# Patient Record
Sex: FEMALE | Race: OTHER | NOT HISPANIC OR LATINO | URBAN - METROPOLITAN AREA
[De-identification: names, ages, dates, MRNs, and addresses within clinical notes are randomized per-mention and may not be internally consistent; named-entity substitution may affect disease eponyms.]

---

## 2017-06-05 ENCOUNTER — INPATIENT (INPATIENT)
Facility: HOSPITAL | Age: 42
LOS: 2 days | Discharge: HOME | DRG: 417 | End: 2017-06-08
Attending: INTERNAL MEDICINE | Admitting: INTERNAL MEDICINE
Payer: COMMERCIAL

## 2017-06-05 VITALS
OXYGEN SATURATION: 100 % | TEMPERATURE: 98 F | RESPIRATION RATE: 16 BRPM | SYSTOLIC BLOOD PRESSURE: 136 MMHG | HEART RATE: 79 BPM | DIASTOLIC BLOOD PRESSURE: 87 MMHG

## 2017-06-05 DIAGNOSIS — K80.50 CALCULUS OF BILE DUCT WITHOUT CHOLANGITIS OR CHOLECYSTITIS WITHOUT OBSTRUCTION: ICD-10-CM

## 2017-06-05 LAB
ALBUMIN SERPL ELPH-MCNC: 4 G/DL — SIGNIFICANT CHANGE UP (ref 3.3–5)
ALP SERPL-CCNC: 270 U/L — HIGH (ref 40–120)
ALT FLD-CCNC: 599 U/L RC — HIGH (ref 10–45)
ANION GAP SERPL CALC-SCNC: 12 MMOL/L — SIGNIFICANT CHANGE UP (ref 5–17)
APPEARANCE UR: ABNORMAL
AST SERPL-CCNC: 366 U/L — HIGH (ref 10–40)
BACTERIA # UR AUTO: ABNORMAL /HPF
BASOPHILS # BLD AUTO: 0 K/UL — SIGNIFICANT CHANGE UP (ref 0–0.2)
BASOPHILS NFR BLD AUTO: 0.2 % — SIGNIFICANT CHANGE UP (ref 0–2)
BILIRUB SERPL-MCNC: 7.1 MG/DL — HIGH (ref 0.2–1.2)
BILIRUB UR-MCNC: ABNORMAL
BUN SERPL-MCNC: 7 MG/DL — SIGNIFICANT CHANGE UP (ref 7–23)
CALCIUM SERPL-MCNC: 9.5 MG/DL — SIGNIFICANT CHANGE UP (ref 8.4–10.5)
CHLORIDE SERPL-SCNC: 100 MMOL/L — SIGNIFICANT CHANGE UP (ref 96–108)
CO2 SERPL-SCNC: 29 MMOL/L — SIGNIFICANT CHANGE UP (ref 22–31)
COLOR SPEC: YELLOW — SIGNIFICANT CHANGE UP
CREAT SERPL-MCNC: 0.78 MG/DL — SIGNIFICANT CHANGE UP (ref 0.5–1.3)
DIFF PNL FLD: NEGATIVE — SIGNIFICANT CHANGE UP
EOSINOPHIL # BLD AUTO: 0.1 K/UL — SIGNIFICANT CHANGE UP (ref 0–0.5)
EOSINOPHIL NFR BLD AUTO: 2.6 % — SIGNIFICANT CHANGE UP (ref 0–6)
EPI CELLS # UR: SIGNIFICANT CHANGE UP /HPF
GLUCOSE SERPL-MCNC: 98 MG/DL — SIGNIFICANT CHANGE UP (ref 70–99)
GLUCOSE UR QL: NEGATIVE — SIGNIFICANT CHANGE UP
HCG UR QL: NEGATIVE — SIGNIFICANT CHANGE UP
HCT VFR BLD CALC: 40.3 % — SIGNIFICANT CHANGE UP (ref 34.5–45)
HGB BLD-MCNC: 13.1 G/DL — SIGNIFICANT CHANGE UP (ref 11.5–15.5)
KETONES UR-MCNC: NEGATIVE — SIGNIFICANT CHANGE UP
LEUKOCYTE ESTERASE UR-ACNC: NEGATIVE — SIGNIFICANT CHANGE UP
LYMPHOCYTES # BLD AUTO: 1.2 K/UL — SIGNIFICANT CHANGE UP (ref 1–3.3)
LYMPHOCYTES # BLD AUTO: 35.7 % — SIGNIFICANT CHANGE UP (ref 13–44)
MCHC RBC-ENTMCNC: 27.7 PG — SIGNIFICANT CHANGE UP (ref 27–34)
MCHC RBC-ENTMCNC: 32.5 GM/DL — SIGNIFICANT CHANGE UP (ref 32–36)
MCV RBC AUTO: 85.1 FL — SIGNIFICANT CHANGE UP (ref 80–100)
MONOCYTES # BLD AUTO: 0.2 K/UL — SIGNIFICANT CHANGE UP (ref 0–0.9)
MONOCYTES NFR BLD AUTO: 6.8 % — SIGNIFICANT CHANGE UP (ref 2–14)
NEUTROPHILS # BLD AUTO: 1.9 K/UL — SIGNIFICANT CHANGE UP (ref 1.8–7.4)
NEUTROPHILS NFR BLD AUTO: 54.7 % — SIGNIFICANT CHANGE UP (ref 43–77)
NITRITE UR-MCNC: NEGATIVE — SIGNIFICANT CHANGE UP
PH UR: 7 — SIGNIFICANT CHANGE UP (ref 5–8)
PLATELET # BLD AUTO: 201 K/UL — SIGNIFICANT CHANGE UP (ref 150–400)
POTASSIUM SERPL-MCNC: 3.9 MMOL/L — SIGNIFICANT CHANGE UP (ref 3.5–5.3)
POTASSIUM SERPL-SCNC: 3.9 MMOL/L — SIGNIFICANT CHANGE UP (ref 3.5–5.3)
PROT SERPL-MCNC: 7.9 G/DL — SIGNIFICANT CHANGE UP (ref 6–8.3)
PROT UR-MCNC: NEGATIVE — SIGNIFICANT CHANGE UP
RBC # BLD: 4.73 M/UL — SIGNIFICANT CHANGE UP (ref 3.8–5.2)
RBC # FLD: 13.3 % — SIGNIFICANT CHANGE UP (ref 10.3–14.5)
RBC CASTS # UR COMP ASSIST: SIGNIFICANT CHANGE UP /HPF (ref 0–2)
SODIUM SERPL-SCNC: 141 MMOL/L — SIGNIFICANT CHANGE UP (ref 135–145)
SP GR SPEC: 1.01 — LOW (ref 1.01–1.02)
UROBILINOGEN FLD QL: NEGATIVE — SIGNIFICANT CHANGE UP
WBC # BLD: 3.5 K/UL — LOW (ref 3.8–10.5)
WBC # FLD AUTO: 3.5 K/UL — LOW (ref 3.8–10.5)
WBC UR QL: SIGNIFICANT CHANGE UP /HPF (ref 0–5)

## 2017-06-05 PROCEDURE — 99253 IP/OBS CNSLTJ NEW/EST LOW 45: CPT | Mod: GC

## 2017-06-05 PROCEDURE — 99285 EMERGENCY DEPT VISIT HI MDM: CPT

## 2017-06-05 PROCEDURE — 76705 ECHO EXAM OF ABDOMEN: CPT | Mod: 26,RT

## 2017-06-05 RX ORDER — SODIUM CHLORIDE 9 MG/ML
2000 INJECTION INTRAMUSCULAR; INTRAVENOUS; SUBCUTANEOUS ONCE
Qty: 0 | Refills: 0 | Status: COMPLETED | OUTPATIENT
Start: 2017-06-05 | End: 2017-06-05

## 2017-06-05 RX ORDER — HEPARIN SODIUM 5000 [USP'U]/ML
5000 INJECTION INTRAVENOUS; SUBCUTANEOUS EVERY 12 HOURS
Qty: 0 | Refills: 0 | Status: DISCONTINUED | OUTPATIENT
Start: 2017-06-05 | End: 2017-06-07

## 2017-06-05 RX ORDER — PANTOPRAZOLE SODIUM 20 MG/1
40 TABLET, DELAYED RELEASE ORAL DAILY
Qty: 0 | Refills: 0 | Status: DISCONTINUED | OUTPATIENT
Start: 2017-06-05 | End: 2017-06-08

## 2017-06-05 RX ORDER — ACETAMINOPHEN 500 MG
650 TABLET ORAL EVERY 6 HOURS
Qty: 0 | Refills: 0 | Status: DISCONTINUED | OUTPATIENT
Start: 2017-06-05 | End: 2017-06-07

## 2017-06-05 RX ORDER — ONDANSETRON 8 MG/1
4 TABLET, FILM COATED ORAL ONCE
Qty: 0 | Refills: 0 | Status: COMPLETED | OUTPATIENT
Start: 2017-06-05 | End: 2017-06-05

## 2017-06-05 RX ORDER — PIPERACILLIN AND TAZOBACTAM 4; .5 G/20ML; G/20ML
3.38 INJECTION, POWDER, LYOPHILIZED, FOR SOLUTION INTRAVENOUS ONCE
Qty: 0 | Refills: 0 | Status: COMPLETED | OUTPATIENT
Start: 2017-06-05 | End: 2017-06-05

## 2017-06-05 RX ORDER — SODIUM CHLORIDE 9 MG/ML
1000 INJECTION, SOLUTION INTRAVENOUS
Qty: 0 | Refills: 0 | Status: DISCONTINUED | OUTPATIENT
Start: 2017-06-05 | End: 2017-06-07

## 2017-06-05 RX ADMIN — PIPERACILLIN AND TAZOBACTAM 200 GRAM(S): 4; .5 INJECTION, POWDER, LYOPHILIZED, FOR SOLUTION INTRAVENOUS at 17:18

## 2017-06-05 RX ADMIN — ONDANSETRON 4 MILLIGRAM(S): 8 TABLET, FILM COATED ORAL at 12:04

## 2017-06-05 RX ADMIN — Medication 204 MILLIGRAM(S): at 16:08

## 2017-06-05 RX ADMIN — SODIUM CHLORIDE 3000 MILLILITER(S): 9 INJECTION INTRAMUSCULAR; INTRAVENOUS; SUBCUTANEOUS at 12:04

## 2017-06-05 NOTE — ED ADULT NURSE NOTE - OBJECTIVE STATEMENT
Pt c/o nausea x 1 week after eating shrimp and pork.  Initially had vomiting as well, which has since resolved.  oral mucosa slightly dry.  Sclera icteric  Pt noted urine has become yellower in color.

## 2017-06-05 NOTE — ED PROVIDER NOTE - OBJECTIVE STATEMENT
37 yo female s/p viral GI illness one week ago with nausea/vomiting, and diarrhea c/o persistent nausea and yellowing of her eyes. Pt denies fevers. Has not had any vomiting or diarrhea in 5 days but became concerned by the yellowing of her eyes and dark colored urine so she went to an UCC. UCC referred her to the Ed for bloodwork. Denies abdominal pain, recent travel and recent abx use. No [facility] contacts. 40 yo female s/p viral GI illness one week ago with nausea/vomiting, and diarrhea c/o persistent nausea and yellowing of her eyes. Pt denies fevers. Has not had any vomiting or diarrhea in 5 days but became concerned by the yellowing of her eyes and dark colored urine so she went to an UCC. UCC referred her to the Ed for bloodwork. Denies abdominal pain, recent travel and recent abx use. No [facility] contacts. 40 yo female s/p viral GI illness one week ago with nausea/vomiting, and diarrhea c/o persistent nausea and yellowing of her eyes. Pt denies fevers. Has not had any vomiting or diarrhea in 5 days but became concerned by the yellowing of her eyes and dark colored urine so she went to an UCC. UCC referred her to the Ed for bloodwork. Denies abdominal pain, recent travel and recent abx use. No sick contacts.

## 2017-06-05 NOTE — H&P ADULT. - ASSESSMENT
pt w/ choledocholelithiasis/ jaundice/ elevated lfts  likely stone obstruction  ercp in am    npo after midnight  iv fluida  dvt proph  gi proph  analgesics prn  f/u lfts

## 2017-06-05 NOTE — H&P ADULT. - HISTORY OF PRESENT ILLNESS
42 yo female s/p viral GI illness one week ago with nausea/vomiting, and diarrhea c/o persistent nausea and yellowing of her eyes. Pt denies fevers. Has not had any vomiting or diarrhea in 5 days but became concerned by the yellowing of her eyes and dark colored urine so she went to urgent care  Denies abdominal pain, recent travel and recent abx use. No sick contacts.

## 2017-06-05 NOTE — ED ADULT NURSE REASSESSMENT NOTE - NS ED NURSE REASSESS COMMENT FT1
Report received from Monisha CHAVEZ.    Patient resting, family at bedside. Patient just admitted to hospital. Will continue to monitor.

## 2017-06-05 NOTE — ED PROVIDER NOTE - ATTENDING CONTRIBUTION TO CARE
MD Nicole:  patient seen and evaluated with the resident.  I was present for key portions of the History & Physical, and I agree with the Impression & Plan.  MD Nicole:  42 yo F, c/o intermittent epigastric & RUQ pain with associated nausea x 1 wk.  Nausea has been constant; pain has been intermittent.  Came to ED because she noticed scleral icterus today.  Nonsmoker.  VS - wnl.  Physical Exam: adult F, obese, NCAT, +scleral icterus, RRR, CTA B.  Abd:  +epigastric/RUQ pain, no R/G. Labs: Tbili = 7.1; elevated ast/alt and alk phos.   Impression:  obstructive jaundice most likely due to gallstones. No evidence of sepsis. Plan:  RUQ US, zofran, IVF, reassess.

## 2017-06-06 LAB
ALBUMIN SERPL ELPH-MCNC: 3.6 G/DL — SIGNIFICANT CHANGE UP (ref 3.3–5)
ALP SERPL-CCNC: 267 U/L — HIGH (ref 40–120)
ALT FLD-CCNC: 561 U/L — HIGH (ref 10–45)
ANION GAP SERPL CALC-SCNC: 17 MMOL/L — SIGNIFICANT CHANGE UP (ref 5–17)
AST SERPL-CCNC: 349 U/L — HIGH (ref 10–40)
BILIRUB SERPL-MCNC: 7.5 MG/DL — HIGH (ref 0.2–1.2)
BLD GP AB SCN SERPL QL: NEGATIVE — SIGNIFICANT CHANGE UP
BUN SERPL-MCNC: 6 MG/DL — LOW (ref 7–23)
CALCIUM SERPL-MCNC: 9.5 MG/DL — SIGNIFICANT CHANGE UP (ref 8.4–10.5)
CHLORIDE SERPL-SCNC: 104 MMOL/L — SIGNIFICANT CHANGE UP (ref 96–108)
CO2 SERPL-SCNC: 19 MMOL/L — LOW (ref 22–31)
CREAT SERPL-MCNC: 0.85 MG/DL — SIGNIFICANT CHANGE UP (ref 0.5–1.3)
GLUCOSE SERPL-MCNC: 83 MG/DL — SIGNIFICANT CHANGE UP (ref 70–99)
POTASSIUM SERPL-MCNC: 3.7 MMOL/L — SIGNIFICANT CHANGE UP (ref 3.5–5.3)
POTASSIUM SERPL-SCNC: 3.7 MMOL/L — SIGNIFICANT CHANGE UP (ref 3.5–5.3)
PROT SERPL-MCNC: 7.1 G/DL — SIGNIFICANT CHANGE UP (ref 6–8.3)
RH IG SCN BLD-IMP: POSITIVE — SIGNIFICANT CHANGE UP
SODIUM SERPL-SCNC: 140 MMOL/L — SIGNIFICANT CHANGE UP (ref 135–145)

## 2017-06-06 PROCEDURE — 43274 ERCP DUCT STENT PLACEMENT: CPT | Mod: GC

## 2017-06-06 PROCEDURE — 43264 ERCP REMOVE DUCT CALCULI: CPT | Mod: GC

## 2017-06-06 PROCEDURE — 43273 ENDOSCOPIC PANCREATOSCOPY: CPT | Mod: GC

## 2017-06-06 RX ORDER — DIPHENHYDRAMINE HCL 50 MG
25 CAPSULE ORAL ONCE
Qty: 0 | Refills: 0 | Status: COMPLETED | OUTPATIENT
Start: 2017-06-06 | End: 2017-06-06

## 2017-06-06 RX ORDER — CIPROFLOXACIN LACTATE 400MG/40ML
400 VIAL (ML) INTRAVENOUS EVERY 12 HOURS
Qty: 0 | Refills: 0 | Status: DISCONTINUED | OUTPATIENT
Start: 2017-06-06 | End: 2017-06-06

## 2017-06-06 RX ADMIN — Medication 125 MILLIGRAM(S): at 23:34

## 2017-06-06 RX ADMIN — SODIUM CHLORIDE 125 MILLILITER(S): 9 INJECTION, SOLUTION INTRAVENOUS at 00:30

## 2017-06-06 RX ADMIN — Medication 25 MILLIGRAM(S): at 21:15

## 2017-06-06 RX ADMIN — Medication 200 MILLIGRAM(S): at 17:34

## 2017-06-07 ENCOUNTER — RESULT REVIEW (OUTPATIENT)
Age: 42
End: 2017-06-07

## 2017-06-07 DIAGNOSIS — K80.50 CALCULUS OF BILE DUCT WITHOUT CHOLANGITIS OR CHOLECYSTITIS WITHOUT OBSTRUCTION: ICD-10-CM

## 2017-06-07 LAB
ALBUMIN SERPL ELPH-MCNC: 3.6 G/DL — SIGNIFICANT CHANGE UP (ref 3.3–5)
ALP SERPL-CCNC: 292 U/L — HIGH (ref 40–120)
ALT FLD-CCNC: 532 U/L — HIGH (ref 10–45)
AMYLASE P1 CFR SERPL: 46 U/L — SIGNIFICANT CHANGE UP (ref 25–125)
ANION GAP SERPL CALC-SCNC: 13 MMOL/L — SIGNIFICANT CHANGE UP (ref 5–17)
APTT BLD: 37.9 SEC — HIGH (ref 27.5–37.4)
AST SERPL-CCNC: 282 U/L — HIGH (ref 10–40)
BILIRUB SERPL-MCNC: 3.4 MG/DL — HIGH (ref 0.2–1.2)
BLD GP AB SCN SERPL QL: NEGATIVE — SIGNIFICANT CHANGE UP
BUN SERPL-MCNC: 8 MG/DL — SIGNIFICANT CHANGE UP (ref 7–23)
CALCIUM SERPL-MCNC: 9.1 MG/DL — SIGNIFICANT CHANGE UP (ref 8.4–10.5)
CHLORIDE SERPL-SCNC: 103 MMOL/L — SIGNIFICANT CHANGE UP (ref 96–108)
CO2 SERPL-SCNC: 22 MMOL/L — SIGNIFICANT CHANGE UP (ref 22–31)
CREAT SERPL-MCNC: 0.75 MG/DL — SIGNIFICANT CHANGE UP (ref 0.5–1.3)
GLUCOSE SERPL-MCNC: 165 MG/DL — HIGH (ref 70–99)
HCT VFR BLD CALC: 39.2 % — SIGNIFICANT CHANGE UP (ref 34.5–45)
HGB BLD-MCNC: 12.6 G/DL — SIGNIFICANT CHANGE UP (ref 11.5–15.5)
INR BLD: 1.14 RATIO — SIGNIFICANT CHANGE UP (ref 0.88–1.16)
LIDOCAIN IGE QN: 24 U/L — SIGNIFICANT CHANGE UP (ref 7–60)
MCHC RBC-ENTMCNC: 26.9 PG — LOW (ref 27–34)
MCHC RBC-ENTMCNC: 32.1 GM/DL — SIGNIFICANT CHANGE UP (ref 32–36)
MCV RBC AUTO: 83.6 FL — SIGNIFICANT CHANGE UP (ref 80–100)
PLATELET # BLD AUTO: 254 K/UL — SIGNIFICANT CHANGE UP (ref 150–400)
POTASSIUM SERPL-MCNC: 4.3 MMOL/L — SIGNIFICANT CHANGE UP (ref 3.5–5.3)
POTASSIUM SERPL-SCNC: 4.3 MMOL/L — SIGNIFICANT CHANGE UP (ref 3.5–5.3)
PROT SERPL-MCNC: 7.9 G/DL — SIGNIFICANT CHANGE UP (ref 6–8.3)
PROTHROM AB SERPL-ACNC: 12.9 SEC — SIGNIFICANT CHANGE UP (ref 10–13.1)
RBC # BLD: 4.69 M/UL — SIGNIFICANT CHANGE UP (ref 3.8–5.2)
RBC # FLD: 15.1 % — HIGH (ref 10.3–14.5)
RH IG SCN BLD-IMP: POSITIVE — SIGNIFICANT CHANGE UP
SODIUM SERPL-SCNC: 138 MMOL/L — SIGNIFICANT CHANGE UP (ref 135–145)
WBC # BLD: 4.06 K/UL — SIGNIFICANT CHANGE UP (ref 3.8–10.5)
WBC # FLD AUTO: 4.06 K/UL — SIGNIFICANT CHANGE UP (ref 3.8–10.5)

## 2017-06-07 PROCEDURE — 99232 SBSQ HOSP IP/OBS MODERATE 35: CPT | Mod: GC

## 2017-06-07 PROCEDURE — 88304 TISSUE EXAM BY PATHOLOGIST: CPT | Mod: 26

## 2017-06-07 RX ORDER — SODIUM CHLORIDE 9 MG/ML
1000 INJECTION, SOLUTION INTRAVENOUS
Qty: 0 | Refills: 0 | Status: DISCONTINUED | OUTPATIENT
Start: 2017-06-07 | End: 2017-06-08

## 2017-06-07 RX ORDER — HYDROMORPHONE HYDROCHLORIDE 2 MG/ML
0.5 INJECTION INTRAMUSCULAR; INTRAVENOUS; SUBCUTANEOUS
Qty: 0 | Refills: 0 | Status: DISCONTINUED | OUTPATIENT
Start: 2017-06-07 | End: 2017-06-07

## 2017-06-07 RX ORDER — DIPHENHYDRAMINE HCL 50 MG
25 CAPSULE ORAL ONCE
Qty: 0 | Refills: 0 | Status: COMPLETED | OUTPATIENT
Start: 2017-06-07 | End: 2017-06-07

## 2017-06-07 RX ORDER — PIPERACILLIN AND TAZOBACTAM 4; .5 G/20ML; G/20ML
3.38 INJECTION, POWDER, LYOPHILIZED, FOR SOLUTION INTRAVENOUS EVERY 8 HOURS
Qty: 0 | Refills: 0 | Status: COMPLETED | OUTPATIENT
Start: 2017-06-07 | End: 2017-06-08

## 2017-06-07 RX ORDER — ACETAMINOPHEN 500 MG
1000 TABLET ORAL ONCE
Qty: 0 | Refills: 0 | Status: DISCONTINUED | OUTPATIENT
Start: 2017-06-07 | End: 2017-06-07

## 2017-06-07 RX ORDER — PIPERACILLIN AND TAZOBACTAM 4; .5 G/20ML; G/20ML
3.38 INJECTION, POWDER, LYOPHILIZED, FOR SOLUTION INTRAVENOUS ONCE
Qty: 0 | Refills: 0 | Status: COMPLETED | OUTPATIENT
Start: 2017-06-07 | End: 2017-06-07

## 2017-06-07 RX ORDER — ONDANSETRON 8 MG/1
4 TABLET, FILM COATED ORAL ONCE
Qty: 0 | Refills: 0 | Status: DISCONTINUED | OUTPATIENT
Start: 2017-06-07 | End: 2017-06-07

## 2017-06-07 RX ORDER — HEPARIN SODIUM 5000 [USP'U]/ML
5000 INJECTION INTRAVENOUS; SUBCUTANEOUS EVERY 8 HOURS
Qty: 0 | Refills: 0 | Status: DISCONTINUED | OUTPATIENT
Start: 2017-06-07 | End: 2017-06-08

## 2017-06-07 RX ADMIN — Medication 25 MILLIGRAM(S): at 05:26

## 2017-06-07 RX ADMIN — HEPARIN SODIUM 5000 UNIT(S): 5000 INJECTION INTRAVENOUS; SUBCUTANEOUS at 21:12

## 2017-06-07 RX ADMIN — SODIUM CHLORIDE 100 MILLILITER(S): 9 INJECTION, SOLUTION INTRAVENOUS at 16:26

## 2017-06-07 RX ADMIN — PIPERACILLIN AND TAZOBACTAM 25 GRAM(S): 4; .5 INJECTION, POWDER, LYOPHILIZED, FOR SOLUTION INTRAVENOUS at 21:12

## 2017-06-07 RX ADMIN — PIPERACILLIN AND TAZOBACTAM 200 GRAM(S): 4; .5 INJECTION, POWDER, LYOPHILIZED, FOR SOLUTION INTRAVENOUS at 16:40

## 2017-06-07 NOTE — PROGRESS NOTE ADULT - SUBJECTIVE AND OBJECTIVE BOX
INTERVAL HPI/OVERNIGHT EVENTS: S/P ERCP yesterday, feeling a bit better. Afebrile. c/o RUQ discomfort. LFTs improving    STATUS POST:  ERCP    POST OPERATIVE DAY #: 1    MEDICATIONS  (STANDING):  dextrose 5% + sodium chloride 0.9%. 1000milliLiter(s) IV Continuous <Continuous>  heparin  Injectable 5000Unit(s) SubCutaneous every 12 hours  pantoprazole  Injectable 40milliGRAM(s) IV Push daily  acetaminophen  IVPB. 1000milliGRAM(s) IV Intermittent once    MEDICATIONS  (PRN):  acetaminophen  Suppository. 650milliGRAM(s) Rectal every 6 hours PRN Moderate Pain (4 - 6)      Vital Signs Last 24 Hrs  T(C): 36.6, Max: 37 (- @ 16:05)  T(F): 97.8, Max: 98.6 (- @ 16:05)  HR: 80 (68 - 86)  BP: 108/64 (102/68 - 122/84)  BP(mean): --  RR: 18 (18 - 18)  SpO2: 95% (95% - 100%)    PHYSICAL EXAM:      Constitutional: AOx3, NAD    Gastrointestinal: Abd soft, mild tenderness RUQ, ND.     Skin: no jaundice    Eyes: + scleral icterus        I&O's Detail  I & Os for 24h ending 2017 07:00  =============================================  IN:    dextrose 5% + sodium chloride 0.9%.: 2400 ml    Oral Fluid: 460 ml    IV PiggyBack: 150 ml    Total IN: 3010 ml  ---------------------------------------------  OUT:    Voided: 2700 ml    Total OUT: 2700 ml  ---------------------------------------------  Total NET: 310 ml    I & Os for current day (as of 2017 11:43)  =============================================  IN:    Total IN: 0 ml  ---------------------------------------------  OUT:    Voided: 800 ml    Total OUT: 800 ml  ---------------------------------------------  Total NET: -800 ml      LABS:                        12.6   4.06  )-----------( 254      ( 2017 09:12 )             39.2         138  |  103  |  8   ----------------------------<  165<H>  4.3   |  22  |  0.75    Ca    9.1      2017 09:09    TPro  7.9  /  Alb  3.6  /  TBili  3.4<H>  /  DBili  2.2<H>  /  AST  282<H>  /  ALT  532<H>  /  AlkPhos  292<H>      PT/INR - ( 2017 09:12 )   PT: 12.9 sec;   INR: 1.14 ratio         PTT - ( 2017 09:12 )  PTT:37.9 sec  Urinalysis Basic - ( 2017 12:01 )    Color: Yellow / Appearance: SL Turbid / S.006 / pH: x  Gluc: x / Ketone: Negative  / Bili: Small / Urobili: Negative   Blood: x / Protein: Negative / Nitrite: Negative   Leuk Esterase: Negative / RBC: 0-2 /HPF / WBC 3-5 /HPF   Sq Epi: x / Non Sq Epi: Few /HPF / Bacteria: Few /HPF        RADIOLOGY & ADDITIONAL STUDIES:

## 2017-06-07 NOTE — PROVIDER CONTACT NOTE (OTHER) - ACTION/TREATMENT ORDERED:
PA notified and ordered IV benadryl 25 mg IVP and solumedrol 125mg IVP to be given. PA will come to assess pt at bedside. Will administer medication and continue to monitor.

## 2017-06-07 NOTE — PROGRESS NOTE ADULT - SUBJECTIVE AND OBJECTIVE BOX
CHIEF COMPLAINT:Patient is a 41y old  Female who presents with a chief complaint of   	  Interval history: S/p ERCP with stent, planned for cholecystectomy      Allergies:  ciprofloxacin (Flushing (Skin); Eye Irritation)      PAST MEDICAL & SURGICAL HISTORY:  No pertinent past medical history  No significant past surgical history      FAMILY HISTORY:  No pertinent family history in first degree relatives      REVIEW OF SYSTEMS:  CONSTITUTIONAL: No fever, weight loss, or fatigue  EYES: No eye pain, visual disturbances, or discharge  NECK: No pain or stiffness  RESPIRATORY: No cough or wheezing, no shortness of breath  CARDIOVASCULAR: No chest pain, palpitations, dizziness, or leg swelling  GASTROINTESTINAL: No abdominal or epigastric pain. No nausea, vomiting, diarrhea or constipation  GENITOURINARY: No dysuria, urinary frequency or urgency, no hematuria  NEUROLOGICAL: No headaches, memory loss, loss of strength, numbness, or tremors  SKIN: No itching, burning, rashes, or lesions   MUSCULOSKELETAL: No joint pain or swelling; No muscle, back, or extremity pain    Medications:  MEDICATIONS  (STANDING):  dextrose 5% + sodium chloride 0.9%. 1000milliLiter(s) IV Continuous <Continuous>  heparin  Injectable 5000Unit(s) SubCutaneous every 12 hours  pantoprazole  Injectable 40milliGRAM(s) IV Push daily  acetaminophen  IVPB. 1000milliGRAM(s) IV Intermittent once    MEDICATIONS  (PRN):  acetaminophen  Suppository. 650milliGRAM(s) Rectal every 6 hours PRN Moderate Pain (4 - 6)    	    PHYSICAL EXAM:  T(C): 36.6, Max: 37 (06-06 @ 16:05)  HR: 80 (68 - 86)  BP: 108/64 (102/68 - 122/84)  RR: 18 (18 - 18)  SpO2: 98% (95% - 100%)  Wt(kg): --  I&O's Summary  I & Os for 24h ending 07 Jun 2017 07:00  =============================================  IN: 3010 ml / OUT: 2700 ml / NET: 310 ml    I & Os for current day (as of 07 Jun 2017 12:59)  =============================================  IN: 0 ml / OUT: 800 ml / NET: -800 ml      Appearance: Normal	  HEENT:   NCAT, PERRL, EOMI	  Lymphatic: No lymphadenopathy  Cardiovascular: Normal S1 S2, RRR  Respiratory: Lungs clear to auscultation BL  Psychiatry: A & O x 3, Mood & affect appropriate  Gastrointestinal:  Soft, Mild RUQ tenderness on palpation, no rebound, + BS  Skin: No rashes, No ecchymoses, No cyanosis	  Neurologic: Non-focal  Extremities: Normal range of motion, No clubbing, cyanosis or edema    	  LABS:	 	                            12.6   4.06  )-----------( 254      ( 07 Jun 2017 09:12 )             39.2     06-07    138  |  103  |  8   ----------------------------<  165<H>  4.3   |  22  |  0.75    Ca    9.1      07 Jun 2017 09:09    TPro  7.9  /  Alb  3.6  /  TBili  3.4<H>  /  DBili  2.2<H>  /  AST  282<H>  /  ALT  532<H>  /  AlkPhos  292<H>  06-07

## 2017-06-07 NOTE — PROGRESS NOTE ADULT - ASSESSMENT
Impression:  1) Obstructive Jaundice due to CBD Stone - s/p ERCP with sphincterotomy and multiple stone extractions s/p plastic CBD stent placement.  2) Cholelithiasis    Plan:  - Monitor liver enzymes  - Repeat ERCP in 6-8 weeks for CBD stent removal and additional stent removal  - Plan for Lap Cholecystectomy per Surgery

## 2017-06-07 NOTE — PROVIDER CONTACT NOTE (OTHER) - ASSESSMENT
Pt having allergic reaction. Pt objectively with red mask around eyes, and is coughing and clearing thoat. Pt subjectively states that her throat is itchy and her face feels warm. Denies difficulty breathing. Pt received Cipro 400 mg in 200ml at 1734, pt denies ever having cipro before.

## 2017-06-07 NOTE — BRIEF OPERATIVE NOTE - OPERATION/FINDINGS
s/p laparoscopic cholecystectomy. The gallbladder was inflamed with an evident perforated wall, with gross spillage of bile and stones. After careful dissection, the cystic duct and artery were clipped and transected. The gallbladder was carefully dissected off of the liver bed with appropriate hemostasis. The liver bed and GB fossa were thoroughly irrigated at the end of the case. The gallstones were suctioned, and the field was clear and hemostatic by the end of the case.

## 2017-06-07 NOTE — PROGRESS NOTE ADULT - SUBJECTIVE AND OBJECTIVE BOX
ADVANCED GI FOLLOW UP NOTE:    SUBJECTIVE:  - Patient denies nausea, vomiting or abdominal pain  - Had allergic reaction to Ciprofloxacin (facial and eye swelling) and given Benadryl/Solumedrol  - No fever or chills    OBJECTIVE:    Vital Signs Last 24 Hrs  T(C): 36.7, Max: 37 (06-06 @ 16:05)  T(F): 98, Max: 98.6 (06-06 @ 16:05)  HR: 79 (62 - 86)  BP: 116/75 (102/68 - 122/84)  BP(mean): --  RR: 18 (18 - 18)  SpO2: 97% (95% - 100%)    PHYSICAL EXAM:  Constitutional: no acute distress  Eyes: no icterus  Neck: no masses, no LAD  Respiratory: normal inspiratory effort; no wheezing or crackles  Cardiovascular: RRR, normal S1/S2, no murmurs/rubs/gallops  Gastrointestinal: soft, nondistended, nontender, +BS  Rectal:   Extremities: no LE edema  Neurological: AAOx3, no asterixis  Skin: no rashes, bruises, petechiae    LABS:                      13.1   3.5   )-----------( 201      ( 05 Jun 2017 12:01 )             40.3     06-06    140  |  104  |  6<L>  ----------------------------<  83  3.7   |  19<L>  |  0.85    Ca    9.5      06 Jun 2017 08:27  TPro  7.1  /  Alb  3.6  /  TBili  7.5<H>  /  DBili  x   /  AST  349<H>  /  ALT  561<H>  /  AlkPhos  267<H>  06-06    LIVER FUNCTIONS - ( 06 Jun 2017 08:27 )  Alb: 3.6 g/dL / Pro: 7.1 g/dL / ALK PHOS: 267 U/L / ALT: 561 U/L / AST: 349 U/L

## 2017-06-07 NOTE — PROGRESS NOTE ADULT - ATTENDING COMMENTS
Impression:  #1.  CBD stones, s/p ERCP/sphincterotomy/stone clearance/biliary stent yesterday 6/6/17  #2.  Abnormal LFTs    Plan:  #1.  Follow LFTs  #2.  Cholecystectomy per surgery.

## 2017-06-07 NOTE — BRIEF OPERATIVE NOTE - POST-OP DX
Cholecystitis, acute with cholelithiasis  06/07/2017  Acute on chronic + choledocholithiasis  Active  Horace Vargas

## 2017-06-07 NOTE — PROGRESS NOTE ADULT - ASSESSMENT
42 yo F with obstructive jaundice, biliary colic, cholecystolithiasis:  1. Obstructive jaundice, biliary colic - s/p ERCP with stent, no need for antibiotics, pain resolved, will need repeat ERCP as outpt for stent removal, c/w PPI  2. Cholecystolithiasis - pt planned for cholecystectomy today, pt has no known cardiac history, good functional capacity >4mets, no chest pain or SOB on exertion, she is low risk for moderate risk procedure, medically optimized for OR.  3. DVT prophylaxis

## 2017-06-08 VITALS
TEMPERATURE: 99 F | OXYGEN SATURATION: 95 % | SYSTOLIC BLOOD PRESSURE: 115 MMHG | RESPIRATION RATE: 18 BRPM | DIASTOLIC BLOOD PRESSURE: 77 MMHG | HEART RATE: 95 BPM

## 2017-06-08 LAB
ALBUMIN SERPL ELPH-MCNC: 3.5 G/DL — SIGNIFICANT CHANGE UP (ref 3.3–5)
ALP SERPL-CCNC: 220 U/L — HIGH (ref 40–120)
ALT FLD-CCNC: 428 U/L RC — HIGH (ref 10–45)
ANION GAP SERPL CALC-SCNC: 12 MMOL/L — SIGNIFICANT CHANGE UP (ref 5–17)
AST SERPL-CCNC: 210 U/L — HIGH (ref 10–40)
BILIRUB DIRECT SERPL-MCNC: 1.2 MG/DL — HIGH (ref 0–0.2)
BILIRUB INDIRECT FLD-MCNC: 1.1 MG/DL — HIGH (ref 0.2–1)
BILIRUB SERPL-MCNC: 2.3 MG/DL — HIGH (ref 0.2–1.2)
BUN SERPL-MCNC: 7 MG/DL — SIGNIFICANT CHANGE UP (ref 7–23)
CALCIUM SERPL-MCNC: 8.6 MG/DL — SIGNIFICANT CHANGE UP (ref 8.4–10.5)
CHLORIDE SERPL-SCNC: 103 MMOL/L — SIGNIFICANT CHANGE UP (ref 96–108)
CO2 SERPL-SCNC: 26 MMOL/L — SIGNIFICANT CHANGE UP (ref 22–31)
CREAT SERPL-MCNC: 0.84 MG/DL — SIGNIFICANT CHANGE UP (ref 0.5–1.3)
GLUCOSE SERPL-MCNC: 103 MG/DL — HIGH (ref 70–99)
HCT VFR BLD CALC: 39.1 % — SIGNIFICANT CHANGE UP (ref 34.5–45)
HGB BLD-MCNC: 12.5 G/DL — SIGNIFICANT CHANGE UP (ref 11.5–15.5)
MAGNESIUM SERPL-MCNC: 1.9 MG/DL — SIGNIFICANT CHANGE UP (ref 1.6–2.6)
MCHC RBC-ENTMCNC: 27.4 PG — SIGNIFICANT CHANGE UP (ref 27–34)
MCHC RBC-ENTMCNC: 32 GM/DL — SIGNIFICANT CHANGE UP (ref 32–36)
MCV RBC AUTO: 85.9 FL — SIGNIFICANT CHANGE UP (ref 80–100)
PHOSPHATE SERPL-MCNC: 3 MG/DL — SIGNIFICANT CHANGE UP (ref 2.5–4.5)
PLATELET # BLD AUTO: 191 K/UL — SIGNIFICANT CHANGE UP (ref 150–400)
POTASSIUM SERPL-MCNC: 3.6 MMOL/L — SIGNIFICANT CHANGE UP (ref 3.5–5.3)
POTASSIUM SERPL-SCNC: 3.6 MMOL/L — SIGNIFICANT CHANGE UP (ref 3.5–5.3)
PROT SERPL-MCNC: 6.8 G/DL — SIGNIFICANT CHANGE UP (ref 6–8.3)
RBC # BLD: 4.56 M/UL — SIGNIFICANT CHANGE UP (ref 3.8–5.2)
RBC # FLD: 13.7 % — SIGNIFICANT CHANGE UP (ref 10.3–14.5)
SODIUM SERPL-SCNC: 141 MMOL/L — SIGNIFICANT CHANGE UP (ref 135–145)
WBC # BLD: 5.9 K/UL — SIGNIFICANT CHANGE UP (ref 3.8–10.5)
WBC # FLD AUTO: 5.9 K/UL — SIGNIFICANT CHANGE UP (ref 3.8–10.5)

## 2017-06-08 PROCEDURE — 81001 URINALYSIS AUTO W/SCOPE: CPT

## 2017-06-08 PROCEDURE — 96374 THER/PROPH/DIAG INJ IV PUSH: CPT

## 2017-06-08 PROCEDURE — 74330 X-RAY BILE/PANC ENDOSCOPY: CPT

## 2017-06-08 PROCEDURE — 82150 ASSAY OF AMYLASE: CPT

## 2017-06-08 PROCEDURE — 86850 RBC ANTIBODY SCREEN: CPT

## 2017-06-08 PROCEDURE — 83735 ASSAY OF MAGNESIUM: CPT

## 2017-06-08 PROCEDURE — 99285 EMERGENCY DEPT VISIT HI MDM: CPT | Mod: 25

## 2017-06-08 PROCEDURE — 81025 URINE PREGNANCY TEST: CPT

## 2017-06-08 PROCEDURE — 85730 THROMBOPLASTIN TIME PARTIAL: CPT

## 2017-06-08 PROCEDURE — C1769: CPT

## 2017-06-08 PROCEDURE — 86900 BLOOD TYPING SEROLOGIC ABO: CPT

## 2017-06-08 PROCEDURE — 88304 TISSUE EXAM BY PATHOLOGIST: CPT

## 2017-06-08 PROCEDURE — 80076 HEPATIC FUNCTION PANEL: CPT

## 2017-06-08 PROCEDURE — 76705 ECHO EXAM OF ABDOMEN: CPT

## 2017-06-08 PROCEDURE — 82248 BILIRUBIN DIRECT: CPT

## 2017-06-08 PROCEDURE — 85027 COMPLETE CBC AUTOMATED: CPT

## 2017-06-08 PROCEDURE — 85610 PROTHROMBIN TIME: CPT

## 2017-06-08 PROCEDURE — 80048 BASIC METABOLIC PNL TOTAL CA: CPT

## 2017-06-08 PROCEDURE — 84100 ASSAY OF PHOSPHORUS: CPT

## 2017-06-08 PROCEDURE — C1889: CPT

## 2017-06-08 PROCEDURE — C2625: CPT

## 2017-06-08 PROCEDURE — 86901 BLOOD TYPING SEROLOGIC RH(D): CPT

## 2017-06-08 PROCEDURE — 83690 ASSAY OF LIPASE: CPT

## 2017-06-08 PROCEDURE — 80053 COMPREHEN METABOLIC PANEL: CPT

## 2017-06-08 RX ORDER — DIPHENHYDRAMINE HCL 50 MG
50 CAPSULE ORAL EVERY 6 HOURS
Qty: 0 | Refills: 0 | Status: DISCONTINUED | OUTPATIENT
Start: 2017-06-08 | End: 2017-06-08

## 2017-06-08 RX ORDER — OXYCODONE HYDROCHLORIDE 5 MG/1
1 TABLET ORAL
Qty: 30 | Refills: 0 | OUTPATIENT
Start: 2017-06-08 | End: 2017-06-13

## 2017-06-08 RX ADMIN — Medication 50 MILLIGRAM(S): at 03:34

## 2017-06-08 RX ADMIN — PIPERACILLIN AND TAZOBACTAM 25 GRAM(S): 4; .5 INJECTION, POWDER, LYOPHILIZED, FOR SOLUTION INTRAVENOUS at 05:43

## 2017-06-08 RX ADMIN — HEPARIN SODIUM 5000 UNIT(S): 5000 INJECTION INTRAVENOUS; SUBCUTANEOUS at 05:43

## 2017-06-08 NOTE — DISCHARGE NOTE ADULT - HOSPITAL COURSE
Underwent ERCP with GI. Pt underwent lap cholecystectomy. 40 yo female s/p viral GI illness one week ago with nausea/vomiting, and diarrhea c/o persistent nausea and yellowing of her eyes. Pt denies fevers. Has not had any vomiting or diarrhea in 5 days but became concerned by the yellowing of her eyes and dark colored urine so she went to urgent care.  Denies abdominal pain, recent travel and recent abx use, No sick contacts.  Abd ultrasound  done showed Cholelithiasis and choledocholithiasis. Elevated LFTs.     ERCP with GI underwent biliary sphincterotomy, extraction of thick sludge and multiple stones, cholangioscopy, and placement of plastic biliary stent. LFTs trending down Pt taken to OR underwent lap cholecystectomy. Post op LFTs continued downtrending. Pt tolerating diet, pain controleld, no n/v. pt discharged home to follow up with Dr. Dylan Harris 40 yo female s/p viral GI illness one week ago with nausea/vomiting, and diarrhea c/o persistent nausea and yellowing of her eyes. Pt denies fevers. Has not had any vomiting or diarrhea in 5 days but became concerned by the yellowing of her eyes and dark colored urine so she went to urgent care.  Denies abdominal pain, recent travel and recent abx use, No sick contacts.  Abd ultrasound  done showed Cholelithiasis and choledocholithiasis. Elevated LFTs.  ERCP with GI underwent biliary sphincterotomy, extraction of thick sludge and multiple stones, cholangioscopy, and placement of plastic biliary stent. LFTs trending down Pt taken to OR underwent lap cholecystectomy. Post op LFTs continued downtrending. Pt tolerating diet, pain controleld, no n/v. pt discharged home to follow up with Dr. Dylan Harris

## 2017-06-08 NOTE — DISCHARGE NOTE ADULT - PLAN OF CARE
post op care diet as tolerated, pain medication as needed, no heavy lifting or straining, may shower Please pat steri strips dry after showering, follow up with Dr. Limon in 1-2 weeks  Repeat ERCP in 6-8 weeks for CBD stent removal and additional stent removal  - Follow up with GI Clinic (Dr Bledsoe/Dr Harris [phone] in 1-2 weeks     notify Dr. Limon if develop fever, chills, nausea, vomiting

## 2017-06-08 NOTE — DISCHARGE NOTE ADULT - CARE PROVIDER_API CALL
Juanjo Limon), Surgery  [address] [facility] [address]  [place] [address]  Phone: [phone]  Fax: [phone]    Juan Harris), Gastroenterology  [address] [place] [address]  Phone: [phone]  Fax: [phone]

## 2017-06-08 NOTE — PROGRESS NOTE ADULT - SUBJECTIVE AND OBJECTIVE BOX
GENERAL SURGERY DAILY PROGRESS NOTE:     Hospital Day: 2    Postoperative Day: 1    Status post: Tanmay simon    Subjective:              Objective:    PE:    Vital Signs Last 24 Hrs  T(C): 36.7, Max: 37 (06-07 @ 20:45)  T(F): 98.1, Max: 98.6 (06-07 @ 20:45)  HR: 72 (72 - 87)  BP: 106/68 (106/68 - 133/76)  BP(mean): 94 (87 - 96)  RR: 18 (16 - 18)  SpO2: 100% (95% - 100%)    I&O's Detail  I & Os for 24h ending 07 Jun 2017 07:00  =============================================  IN:    dextrose 5% + sodium chloride 0.9%: 2400 ml    Oral Fluid: 460 ml    IV PiggyBack: 150 ml    Total IN: 3010 ml  ---------------------------------------------  OUT:    Voided: 2700 ml    Total OUT: 2700 ml  ---------------------------------------------  Total NET: 310 ml    I & Os for current day (as of 08 Jun 2017 03:59)  =============================================  IN:    lactated ringers.: 300 ml    Oral Fluid: 300 ml    Total IN: 600 ml  ---------------------------------------------  OUT:    Voided: 1800 ml    Total OUT: 1800 ml  ---------------------------------------------  Total NET: -1200 ml      Daily Height in cm: 160 (07 Jun 2017 11:51)    Daily     MEDICATIONS  (STANDING):  pantoprazole  Injectable 40milliGRAM(s) IV Push daily  heparin  Injectable 5000Unit(s) SubCutaneous every 8 hours  piperacillin/tazobactam IVPB. 3.375Gram(s) IV Intermittent every 8 hours    MEDICATIONS  (PRN):  oxyCODONE  5 mG/acetaminophen 325 mG 1Tablet(s) Oral every 4 hours PRN Moderate Pain (4 - 6)  oxyCODONE  5 mG/acetaminophen 325 mG 2Tablet(s) Oral every 4 hours PRN Severe Pain (7 - 10)  diphenhydrAMINE   Capsule 50milliGRAM(s) Oral every 6 hours PRN Rash and/or Itching      LABS:                        12.6   4.06  )-----------( 254      ( 07 Jun 2017 09:12 )             39.2     06-07    138  |  103  |  8   ----------------------------<  165<H>  4.3   |  22  |  0.75    Ca    9.1      07 Jun 2017 09:09    TPro  7.9  /  Alb  3.6  /  TBili  3.4<H>  /  DBili  2.2<H>  /  AST  282<H>  /  ALT  532<H>  /  AlkPhos  292<H>  06-07    PT/INR - ( 07 Jun 2017 09:12 )   PT: 12.9 sec;   INR: 1.14 ratio         PTT - ( 07 Jun 2017 09:12 )  PTT:37.9 sec      RADIOLOGY & ADDITIONAL STUDIES: GENERAL SURGERY DAILY PROGRESS NOTE:     Hospital Day: 2    Postoperative Day: 1    Status post: Tanmay montes    Subjective:  No overnight events. Pt states pain controlled, tolerating diet, ambulating.  Denies any nausea/ vomiting/ SOB/ CP/ Palpitations.             Objective:    PE:  Gen: NAD A&O x3  Abdomen: softly distended appropriately tender                   incisions C/D/I      Vital Signs Last 24 Hrs  T(C): 36.7, Max: 37 (06-07 @ 20:45)  T(F): 98.1, Max: 98.6 (06-07 @ 20:45)  HR: 72 (72 - 87)  BP: 106/68 (106/68 - 133/76)  BP(mean): 94 (87 - 96)  RR: 18 (16 - 18)  SpO2: 100% (95% - 100%)    I&O's Detail  I & Os for 24h ending 07 Jun 2017 07:00  =============================================  IN:    dextrose 5% + sodium chloride 0.9%: 2400 ml    Oral Fluid: 460 ml    IV PiggyBack: 150 ml    Total IN: 3010 ml  ---------------------------------------------  OUT:    Voided: 2700 ml    Total OUT: 2700 ml  ---------------------------------------------  Total NET: 310 ml    I & Os for current day (as of 08 Jun 2017 03:59)  =============================================  IN:    lactated ringers.: 300 ml    Oral Fluid: 300 ml    Total IN: 600 ml  ---------------------------------------------  OUT:    Voided: 1800 ml    Total OUT: 1800 ml  ---------------------------------------------  Total NET: -1200 ml      Daily Height in cm: 160 (07 Jun 2017 11:51)    Daily     MEDICATIONS  (STANDING):  pantoprazole  Injectable 40milliGRAM(s) IV Push daily  heparin  Injectable 5000Unit(s) SubCutaneous every 8 hours  piperacillin/tazobactam IVPB. 3.375Gram(s) IV Intermittent every 8 hours    MEDICATIONS  (PRN):  oxyCODONE  5 mG/acetaminophen 325 mG 1Tablet(s) Oral every 4 hours PRN Moderate Pain (4 - 6)  oxyCODONE  5 mG/acetaminophen 325 mG 2Tablet(s) Oral every 4 hours PRN Severe Pain (7 - 10)  diphenhydrAMINE   Capsule 50milliGRAM(s) Oral every 6 hours PRN Rash and/or Itching      LABS:                        12.6   4.06  )-----------( 254      ( 07 Jun 2017 09:12 )             39.2     06-07    138  |  103  |  8   ----------------------------<  165<H>  4.3   |  22  |  0.75    Ca    9.1      07 Jun 2017 09:09    TPro  7.9  /  Alb  3.6  /  TBili  3.4<H>  /  DBili  2.2<H>  /  AST  282<H>  /  ALT  532<H>  /  AlkPhos  292<H>  06-07    PT/INR - ( 07 Jun 2017 09:12 )   PT: 12.9 sec;   INR: 1.14 ratio         PTT - ( 07 Jun 2017 09:12 )  PTT:37.9 sec

## 2017-06-08 NOTE — PROGRESS NOTE ADULT - ASSESSMENT
41F choledocholithiasis s/p ERCP, stone extraction, CBD stent, and sphincteroplasty, s/p lap simon 41F choledocholithiasis s/p ERCP, stone extraction, CBD stent, and sphincteroplasty, s/p lap simon  - Reg diet  - OOB/AMB  - continue pain control regimen  - D/C home

## 2017-06-08 NOTE — DISCHARGE NOTE ADULT - CARE PLAN
Principal Discharge DX:	Choledocholithiasis  Goal:	post op care  Instructions for follow-up, activity and diet:	diet as tolerated, pain medication as needed, no heavy lifting or straining, may shower Please pat steri strips dry after showering, follow up with Dr. Limon in 1-2 weeks  Repeat ERCP in 6-8 weeks for CBD stent removal and additional stent removal  - Follow up with GI Clinic (Dr Bledsoe/Dr Harris [phone] in 1-2 weeks     notify Dr. Limon if develop fever, chills, nausea, vomiting Principal Discharge DX:	Choledocholithiasis  Goal:	post op care  Instructions for follow-up, activity and diet:	diet as tolerated, pain medication as needed, no heavy lifting or straining, may shower Please pat steri strips dry after showering, follow up with Dr. Limon in 1-2 weeks  Repeat ERCP in 6-8 weeks for CBD stent removal and additional stent removal  - Follow up with GI Clinic (Dr Bledsoe/Dr Harris [phone] in 1-2 weeks     notify Dr. Limon if develop fever, chills, nausea, vomiting Principal Discharge DX:	Choledocholithiasis  Goal:	post op care  Instructions for follow-up, activity and diet:	diet as tolerated, pain medication as needed, no heavy lifting or straining, may shower Please pat steri strips dry after showering, follow up with Dr. Limon in 1-2 weeks  Repeat ERCP in 6-8 weeks for CBD stent removal and additional stent removal  - Follow up with GI Clinic (Dr Bledsoe/Dr Harris [phone] in 1-2 weeks     notify Dr. Limon if develop fever, chills, nausea, vomiting Principal Discharge DX:	Choledocholithiasis  Goal:	post op care  Instructions for follow-up, activity and diet:	diet as tolerated, pain medication as needed, no heavy lifting or straining, may shower Please pat steri strips dry after showering, follow up with Dr. Limon in 1-2 weeks  Repeat ERCP in 6-8 weeks for CBD stent removal and additional stent removal  - Follow up with GI Clinic (Dr Bledsoe/Dr Harris [phone] in 1-2 weeks     notify Dr. Limon if develop fever, chills, nausea, vomiting

## 2017-06-08 NOTE — CHART NOTE - NSCHARTNOTEFT_GEN_A_CORE
S: Patient underwent Laparoscopic cholecystectomy. Intraoperatively, the gallbladder was found to be perforated with gross spillage of bile and stones in the abdomen. She tolerated procedure without issue and was sent to PACU. Patient denies chest pain, shortness of breath, nausea, vomiting, lightheadedness, or dizziness.  Pain was well controlled.    O:T(C): 37, Max: 37 (06-07 @ 20:45)  HR: 80 (74 - 82)  BP: 121/76 (121/76 - 131/67)  RR: 18 (16 - 18)  SpO2: 100% (98% - 100%)  Wt(kg): --                        12.6   4.06  )-----------( 254      ( 07 Jun 2017 09:12 )             39.2        06-07    138  |  103  |  8   ----------------------------<  165<H>  4.3   |  22  |  0.75    Ca    9.1      07 Jun 2017 09:09      Gen: NAD  Abd: Soft, appropriately TTP, nondistended.  No palpable masses. incisions c/d/i        Assessment/Plan:  41y Female s/p Laparoscopic cholecystectomy    c/w Zosyn overnight for bile spillage  Pain control  Reg Diet  DVT PPX  Out of bed and encourage early ambulation  Incentive spirometry  d/c home t/m

## 2017-06-08 NOTE — PROGRESS NOTE ADULT - ASSESSMENT
40 yo F with obstructive jaundice, biliary colic, cholecystolithiasis:  1. Obstructive jaundice, biliary colic - s/p ERCP with stent, follow with GI outpt  2. Cholecystolithiasis - s/p lap cholecystectomy, pain control, discharge home today  3. DVT prophylaxis

## 2017-06-08 NOTE — PROGRESS NOTE ADULT - SUBJECTIVE AND OBJECTIVE BOX
ADVANCED GI FOLLOW UP NOTE:    SUBJECTIVE:  - Pt tolerating oral diet  - Denies nausea or vomiting    OBJECTIVE:  Vital Signs Last 24 Hrs  T(C): 36.8, Max: 37 (06-07 @ 20:45)  T(F): 98.2, Max: 98.6 (06-07 @ 20:45)  HR: 79 (72 - 87)  BP: 118/79 (106/68 - 133/76)  BP(mean): 94 (87 - 96)  RR: 18 (16 - 18)  SpO2: 97% (95% - 100%)    PHYSICAL EXAM:  Constitutional: no acute distress  Eyes: no icterus  Neck: no masses, no LAD  Respiratory: normal inspiratory effort; no wheezing or crackles  Cardiovascular: RRR, normal S1/S2, no murmurs/rubs/gallops  Gastrointestinal: soft, surgical scars c/d/i, NT, +BS  Extremities: no LE edema  Neurological: AAOx3, no asterixis  Skin: no rashes, bruises, petechiae    LABS:                        12.6   4.06  )-----------( 254      ( 07 Jun 2017 09:12 )             39.2     06-07    138  |  103  |  8   ----------------------------<  165<H>  4.3   |  22  |  0.75    Ca    9.1      07 Jun 2017 09:09  TPro  7.9  /  Alb  3.6  /  TBili  3.4<H>  /  DBili  2.2<H>  /  AST  282<H>  /  ALT  532<H>  /  AlkPhos  292<H>  06-07  PT/INR - ( 07 Jun 2017 09:12 )   PT: 12.9 sec;   INR: 1.14 ratio    PTT - ( 07 Jun 2017 09:12 )  PTT:37.9 sec  LIVER FUNCTIONS - ( 07 Jun 2017 09:09 )  Alb: 3.6 g/dL / Pro: 7.9 g/dL / ALK PHOS: 292 U/L / ALT: 532 U/L / AST: 282 U/L / GGT: x

## 2017-06-08 NOTE — PROGRESS NOTE ADULT - SUBJECTIVE AND OBJECTIVE BOX
CHIEF COMPLAINT:Patient is a 41y old  Female who presents with a chief complaint of   	  Interval history: S/p ERCP with stent, planned for cholecystectomy      Allergies:  ciprofloxacin (Flushing (Skin); Eye Irritation)      PAST MEDICAL & SURGICAL HISTORY:  No pertinent past medical history  No significant past surgical history      FAMILY HISTORY:  No pertinent family history in first degree relatives      REVIEW OF SYSTEMS:  CONSTITUTIONAL: No fever, weight loss, or fatigue  EYES: No eye pain, visual disturbances, or discharge  NECK: No pain or stiffness  RESPIRATORY: No cough or wheezing, no shortness of breath  CARDIOVASCULAR: No chest pain, palpitations, dizziness, or leg swelling  GASTROINTESTINAL: Mild postsurgical pain. No nausea, vomiting, diarrhea or constipation  GENITOURINARY: No dysuria, urinary frequency or urgency, no hematuria  NEUROLOGICAL: No headaches, memory loss, loss of strength, numbness, or tremors  SKIN: No itching, burning, rashes, or lesions   MUSCULOSKELETAL: No joint pain or swelling; No muscle, back, or extremity pain      Medications:  MEDICATIONS  (STANDING):  pantoprazole  Injectable 40milliGRAM(s) IV Push daily  heparin  Injectable 5000Unit(s) SubCutaneous every 8 hours    MEDICATIONS  (PRN):  oxyCODONE  5 mG/acetaminophen 325 mG 1Tablet(s) Oral every 4 hours PRN Moderate Pain (4 - 6)  oxyCODONE  5 mG/acetaminophen 325 mG 2Tablet(s) Oral every 4 hours PRN Severe Pain (7 - 10)  diphenhydrAMINE   Capsule 50milliGRAM(s) Oral every 6 hours PRN Rash and/or Itching    	    PHYSICAL EXAM:  T(C): 37, Max: 37 (06-07 @ 20:45)  HR: 95 (72 - 95)  BP: 115/77 (106/68 - 133/76)  RR: 18 (16 - 18)  SpO2: 95% (95% - 100%)  Wt(kg): --  I&O's Summary  I & Os for 24h ending 08 Jun 2017 07:00  =============================================  IN: 600 ml / OUT: 1800 ml / NET: -1200 ml    I & Os for current day (as of 08 Jun 2017 12:45)  =============================================  IN: 0 ml / OUT: 600 ml / NET: -600 ml      Appearance: Normal	  HEENT:   NCAT, PERRL, EOMI	  Lymphatic: No lymphadenopathy  Cardiovascular: Normal S1 S2, RRR  Respiratory: Lungs clear to auscultation BL  Psychiatry: A & O x 3, Mood & affect appropriate  Gastrointestinal:  Soft, Mild RUQ tenderness on palpation, no rebound, + BS  Skin: No rashes, No ecchymoses, No cyanosis	  Neurologic: Non-focal  Extremities: Normal range of motion, No clubbing, cyanosis or edema    	  LABS:	 	                          12.5   5.9   )-----------( 191      ( 08 Jun 2017 08:22 )             39.1     06-08    141  |  103  |  7   ----------------------------<  103<H>  3.6   |  26  |  0.84    Ca    8.6      08 Jun 2017 08:22  Phos  3.0     06-08  Mg     1.9     06-08    TPro  6.8  /  Alb  3.5  /  TBili  2.3<H>  /  DBili  1.2<H>  /  AST  210<H>  /  ALT  428<H>  /  AlkPhos  220<H>  06-08    proBNP:   Lipid Profile:   HgA1c:   TSH: CHIEF COMPLAINT:Patient is a 41y old  Female who presents with a chief complaint of biliary colic  	  Interval history: S/p cholecystectomy, tolerated well, ambulating      Allergies:  ciprofloxacin (Flushing (Skin); Eye Irritation)      PAST MEDICAL & SURGICAL HISTORY:  No pertinent past medical history  No significant past surgical history      FAMILY HISTORY:  No pertinent family history in first degree relatives      REVIEW OF SYSTEMS:  CONSTITUTIONAL: No fever, weight loss, or fatigue  EYES: No eye pain, visual disturbances, or discharge  NECK: No pain or stiffness  RESPIRATORY: No cough or wheezing, no shortness of breath  CARDIOVASCULAR: No chest pain, palpitations, dizziness, or leg swelling  GASTROINTESTINAL: Mild postsurgical pain. No nausea, vomiting, diarrhea or constipation  GENITOURINARY: No dysuria, urinary frequency or urgency, no hematuria  NEUROLOGICAL: No headaches, memory loss, loss of strength, numbness, or tremors  SKIN: No itching, burning, rashes, or lesions   MUSCULOSKELETAL: No joint pain or swelling; No muscle, back, or extremity pain      Medications:  MEDICATIONS  (STANDING):  pantoprazole  Injectable 40milliGRAM(s) IV Push daily  heparin  Injectable 5000Unit(s) SubCutaneous every 8 hours    MEDICATIONS  (PRN):  oxyCODONE  5 mG/acetaminophen 325 mG 1Tablet(s) Oral every 4 hours PRN Moderate Pain (4 - 6)  oxyCODONE  5 mG/acetaminophen 325 mG 2Tablet(s) Oral every 4 hours PRN Severe Pain (7 - 10)  diphenhydrAMINE   Capsule 50milliGRAM(s) Oral every 6 hours PRN Rash and/or Itching    	    PHYSICAL EXAM:  T(C): 37, Max: 37 (06-07 @ 20:45)  HR: 95 (72 - 95)  BP: 115/77 (106/68 - 133/76)  RR: 18 (16 - 18)  SpO2: 95% (95% - 100%)  Wt(kg): --  I&O's Summary  I & Os for 24h ending 08 Jun 2017 07:00  =============================================  IN: 600 ml / OUT: 1800 ml / NET: -1200 ml    I & Os for current day (as of 08 Jun 2017 12:45)  =============================================  IN: 0 ml / OUT: 600 ml / NET: -600 ml      Appearance: Normal	  HEENT:   NCAT, PERRL, EOMI	  Lymphatic: No lymphadenopathy  Cardiovascular: Normal S1 S2, RRR  Respiratory: Lungs clear to auscultation BL  Psychiatry: A & O x 3, Mood & affect appropriate  Gastrointestinal:  Soft, Mild RUQ tenderness on palpation, no rebound, + BS  Skin: No rashes, No ecchymoses, No cyanosis	  Neurologic: Non-focal  Extremities: Normal range of motion, No clubbing, cyanosis or edema    	  LABS:	 	                          12.5   5.9   )-----------( 191      ( 08 Jun 2017 08:22 )             39.1     06-08    141  |  103  |  7   ----------------------------<  103<H>  3.6   |  26  |  0.84    Ca    8.6      08 Jun 2017 08:22  Phos  3.0     06-08  Mg     1.9     06-08    TPro  6.8  /  Alb  3.5  /  TBili  2.3<H>  /  DBili  1.2<H>  /  AST  210<H>  /  ALT  428<H>  /  AlkPhos  220<H>  06-08    proBNP:   Lipid Profile:   HgA1c:   TSH:

## 2017-06-08 NOTE — DISCHARGE NOTE ADULT - CARE PROVIDERS DIRECT ADDRESSES
[email or web address]

## 2017-06-08 NOTE — DISCHARGE NOTE ADULT - PATIENT PORTAL LINK FT
“You can access the FollowHealth Patient Portal, offered by [facility], by registering with the following website: [email or web address]”

## 2017-06-08 NOTE — DISCHARGE NOTE ADULT - MEDICATION SUMMARY - MEDICATIONS TO TAKE
I will START or STAY ON the medications listed below when I get home from the hospital:    acetaminophen-oxycodone 325 mg-5 mg oral tablet  -- 1 tab(s) by mouth every 4 hours, As needed, Moderate Pain (4 - 6) MDD:6  -- Indication: For post op pain

## 2017-06-08 NOTE — PROGRESS NOTE ADULT - ASSESSMENT
Impression:  1) Obstructive Jaundice due to CBD Stone - s/p ERCP with sphincterotomy and multiple stone extractions s/p plastic CBD stent placement.  2) Cholelithiasis s/p Lap Cholecystectomy    Plan:  - Monitor liver enzymes  - Repeat ERCP in 6-8 weeks for CBD stent removal and additional stent removal  - Follow up with GI Clinic (Dr Bledsoe/Dr Harris [phone]) as outpatient  - Diet as tolerated

## 2017-06-10 ENCOUNTER — TRANSCRIPTION ENCOUNTER (OUTPATIENT)
Age: 42
End: 2017-06-10

## 2017-06-13 DIAGNOSIS — Z98.890 OTHER SPECIFIED POSTPROCEDURAL STATES: ICD-10-CM

## 2017-06-13 DIAGNOSIS — R10.11 RIGHT UPPER QUADRANT PAIN: ICD-10-CM

## 2017-06-13 DIAGNOSIS — Z46.89 ENCOUNTER FOR FITTING AND ADJUSTMENT OF OTHER SPECIFIED DEVICES: ICD-10-CM

## 2017-06-13 DIAGNOSIS — K80.50 CALCULUS OF BILE DUCT W/OUT CHOLANGITIS OR CHOLECYSTITIS W/OUT OBSTRUCTION: ICD-10-CM

## 2017-06-16 LAB — SURGICAL PATHOLOGY STUDY: SIGNIFICANT CHANGE UP

## 2017-06-19 ENCOUNTER — LABORATORY RESULT (OUTPATIENT)
Age: 42
End: 2017-06-19

## 2017-06-23 ENCOUNTER — APPOINTMENT (OUTPATIENT)
Dept: GASTROENTEROLOGY | Facility: CLINIC | Age: 42
End: 2017-06-23

## 2017-07-11 ENCOUNTER — OUTPATIENT (OUTPATIENT)
Dept: OUTPATIENT SERVICES | Facility: HOSPITAL | Age: 42
LOS: 1 days | End: 2017-07-11
Payer: COMMERCIAL

## 2017-07-11 ENCOUNTER — APPOINTMENT (OUTPATIENT)
Dept: GASTROENTEROLOGY | Facility: HOSPITAL | Age: 42
End: 2017-07-11

## 2017-07-11 DIAGNOSIS — Z98.890 OTHER SPECIFIED POSTPROCEDURAL STATES: ICD-10-CM

## 2017-07-11 DIAGNOSIS — R10.11 RIGHT UPPER QUADRANT PAIN: ICD-10-CM

## 2017-07-11 DIAGNOSIS — Z46.89 ENCOUNTER FOR FITTING AND ADJUSTMENT OF OTHER SPECIFIED DEVICES: ICD-10-CM

## 2017-07-11 DIAGNOSIS — K80.50 CALCULUS OF BILE DUCT WITHOUT CHOLANGITIS OR CHOLECYSTITIS WITHOUT OBSTRUCTION: ICD-10-CM

## 2017-07-11 PROCEDURE — 43264 ERCP REMOVE DUCT CALCULI: CPT | Mod: GC

## 2017-07-11 PROCEDURE — 43273 ENDOSCOPIC PANCREATOSCOPY: CPT

## 2017-07-11 PROCEDURE — 43273 ENDOSCOPIC PANCREATOSCOPY: CPT | Mod: GC

## 2017-07-11 PROCEDURE — C1726: CPT

## 2017-07-11 PROCEDURE — 43264 ERCP REMOVE DUCT CALCULI: CPT

## 2017-07-11 PROCEDURE — 74328 X-RAY BILE DUCT ENDOSCOPY: CPT | Mod: 26,GC

## 2017-07-11 PROCEDURE — 43275 ERCP REMOVE FORGN BODY DUCT: CPT | Mod: GC

## 2017-07-11 PROCEDURE — 43275 ERCP REMOVE FORGN BODY DUCT: CPT

## 2017-07-11 PROCEDURE — C1769: CPT

## 2017-07-11 PROCEDURE — 43277 ERCP EA DUCT/AMPULLA DILATE: CPT | Mod: 59,GC

## 2019-11-08 NOTE — DISCHARGE NOTE ADULT - BECAUSE OF A PHYSICAL, MENTAL OR EMOTIONAL CONDITION, DO YOU HAVE DIFFICULTY DOING  ERRANDS ALONE LIKE VISITING A DOCTOR'S OFFICE OR SHOPPING (15 YEARS AND OLDER)
Post Acute Skilled Nursing Home Initial Visit Note     Date of Service: 11/8/2019  Location seen at: [facility]  Subacute / Skilled Need: Rehabilitation    PCP: Summer Bentley MD   Patient Care Team:  Summer Bentley MD as PCP - General (Internal Medicine)  Summer Rebollar CNP as Post Acute Facility Provider: APC (Nurse Practitioner - Family)  Esteban Carvalho MD as Post Acute Facility Provider: Physician (Geriatric Medicine)  Seen by Dr Ponce    Alejandro Peraza is a 77 year old male presenting to [facility] for: rehab.   History of Present Illness: Pt is a 77 yr old male with a PMH of HTN, BPH, hx of DVT, who presented to the ER at [facility] with progressive weakness in the lower extremities. Also increased swelling and difficulty in moving the right lower extremity. -most recent DVT was 18 months ago and at that time he completed 2 months of xarelto treatment - xarelto was very expensive for him, however, he was warfarin before that and had difficulty reaching therapeutic levels. Questionable history of underlying malignancy will need further investigation. Pt dx with sepsis, UTI, DVT RLE,- started on Eliquis,- No PE on CTA chest  Also found to have PAF- followed by cards, goal is to monitor with event monitor- pt sent here for rehab    Pt seen today up at bedside- awake/alert- very pleasant- pt c/o leg weakness, denies any pain, no f/c/cp/sob, cough, nvd, New onset of Afib, b lower extremity weakness, Blood clots to both lower extremity. Goal is improve function and go home.    11/8/19  Pt seen today up in chair - awake/alert feeling well- sts has been having incont loose stools- pt with hx c diff and is in isolation pending result of lab sent- pt denies any pain, no f/c/cp/sob, cough, no nv- pt denies any dysuria- sts is normally incont urine but not stool- appeite is ok, sleep ok  Pt is participating in daily PT/OT and is  amb 50 ft rw cga  Transfers min  a  Bed mob: cga/bakari    HISTORY  No past medical history on file.   reports that he has never smoked. He has never used smokeless tobacco. He reports that he does not drink alcohol.  No past surgical history on file.  No family history on file.  History     Not marked as reviewed during this visit.          PROBLEM LIST:  Patient Active Problem List   Diagnosis   • Vitamin B12 deficiency   • Benign prostatic hyperplasia   • Clostridium difficile infection   • DJD (degenerative joint disease) of cervical spine   • History of DVT (deep vein thrombosis)   • Thoracic myelopathy   • Venous insufficiency of both lower extremities   • Gastric reflux   • DVT (deep venous thrombosis) (CMS/HCC)   • UTI (urinary tract infection)   • Atrial fibrillation (CMS/HCC)   • Debility   • ACP (advance care planning)   • Inguinal hernia   • HTN (hypertension), benign   • Pressure injury of coccygeal region, stage 2 (CMS/HCC)   • Diarrhea       ADVANCE DIRECTIVES:  Power of  Status:  Status Unknown  Code Status:  Full Code  Goals of Care: return home    DEPRESSION SCREENING:  Recent PHQ 2/9 Score    PHQ 2:       PHQ 9:       DEPRESSION ASSESSMENT/PLAN:  Depression screening is negative no further plan needed.    ALLERGIES:  Allergies as of 11/08/2019   • (No Known Allergies)       CURRENT MEDICATIONS:   Current Outpatient Medications   Medication Sig Dispense Refill   • apixaBAN (ELIQUIS) 5 MG Tab Take 5 mg by mouth every 12 hours.     • finasteride (PROSCAR) 5 MG tablet Take 5 mg by mouth daily.     • hydrochlorothiazide (HYDRODIURIL) 50 MG tablet TAKE 1 TABLET DAILY 90 tablet 4   • cyanocobalamin (VITAMIN B-12) 100 MCG tablet Take by mouth daily.     • Vitamin D, Cholecalciferol, 400 units tablet Take 1,000 Units by mouth daily.      • tamsulosin (FLOMAX) 0.4 MG Cap Take 0.4 mg by mouth 2 times daily.     • losartan (COZAAR) 100 MG tablet Take 1 tablet by mouth daily. 90 tablet 3   • potassium CHLORIDE (KLOR-CON M) 20 MEQ tea  ER tablet Take 1 tablet by mouth daily. 90 tablet 3   • bethanechol (URECHOLINE) 50 MG tablet Take 25 mg by mouth 2 times daily.      • methenamine (HIPREX) 1 g tablet Take 1 g by mouth.        No current facility-administered medications for this visit.      Medications reviewed / reconciled: Yes    BASELINE FUNCTIONAL STATUS:  rollator    CURRENT FUNCTIONAL STATUS:  Walker    DIET:  Consistency: General   Type: regular  Appetite: Good    REVIEW OF SYSTEMS:  Review of Systems   Constitutional: Negative.    HENT: Negative.    Respiratory: Negative.    Cardiovascular: Negative.         RLE >LLE    Gastrointestinal: Positive for diarrhea. Negative for constipation, nausea and vomiting.        See HPI   Genitourinary:        See HPI   Musculoskeletal: Negative.         Weakness BLE   Neurological: Negative.    Hematological: Negative.    All other systems reviewed and are negative.      VITALS:  Vitals:    11/08/19 1001   BP: 115/59   Pulse: 74   Resp: 18   Temp: 98.6 °F (37 °C)       PHYSICAL ASSESSMENT:  Physical Exam  Vitals signs and nursing note reviewed.   Constitutional:       Appearance: Normal appearance.   HENT:      Head: Normocephalic and atraumatic.   Neck:      Musculoskeletal: Neck supple.   Cardiovascular:      Rate and Rhythm: Normal rate and regular rhythm.      Heart sounds: Normal heart sounds.   Pulmonary:      Effort: Pulmonary effort is normal.      Breath sounds: Normal breath sounds.   Abdominal:      General: Bowel sounds are normal.      Palpations: Abdomen is soft.   Musculoskeletal: Normal range of motion.   Skin:     General: Skin is warm and dry.      Comments: Wound notes   Neurological:      General: No focal deficit present.      Mental Status: He is alert and oriented to person, place, and time.         ASSESSMENT AND PLAN    DVT RLE:  On ELiquis    UTI:w/ BPH w/ retention  Completed ceftriaxone at hosp   Cont on methenamine, flomax, urecholine, finasteride  F/u urology    PAF:  On  Eliquis, needs an event monitor  F/u with cardiology.    HTN:  Cont meds, on losartan.  EMIR shift diet    Diarrhea:  On probiotic  c diff pending  In isolation pending results  ID following    FOLLOW UP APPOINTMENTS:  Need urology f/u, cardiology  AMARA BRUNNER Speciality:  CARDIOLOGY Consult Reason:  PAfib   Physician Name:  FILEMON WING Speciality:  INTERNAL MEDICINE Consult Reason:  PAfib   Physician Name:  KIMBERLYN DEAN Speciality:  SURGERY Consult Reason:  prostatic enlargementy & retention urine   Physician Name:  BRONWYN HOPE Speciality:  SURGERY Consult Reason:  Large right inguinal hernia containing nondilated loops of small bowel.   Physician Name:  CIRO HARPER Speciality:  SURGERY Consult Reason:  BPH, urinary retention, UTI     DISCHARGE PLANNING: home    Prognosis: good    Discussed with: Patient and SW    Barriers to discharge: therapy needs    Anticipated disposition: Home    Total time spent is more than 25 minutes, with more than 50% of the time spent in coordination of care, counseling, review of records and discussion of plan of care with the patient /staff /family.   No

## 2020-03-16 ENCOUNTER — TRANSCRIPTION ENCOUNTER (OUTPATIENT)
Age: 45
End: 2020-03-16

## 2020-03-16 ENCOUNTER — EMERGENCY (EMERGENCY)
Facility: HOSPITAL | Age: 45
LOS: 1 days | Discharge: HOME | End: 2020-03-16
Attending: EMERGENCY MEDICINE
Payer: COMMERCIAL

## 2020-03-16 VITALS
TEMPERATURE: 98 F | DIASTOLIC BLOOD PRESSURE: 80 MMHG | SYSTOLIC BLOOD PRESSURE: 119 MMHG | RESPIRATION RATE: 20 BRPM | HEART RATE: 92 BPM | OXYGEN SATURATION: 97 %

## 2020-03-16 VITALS
SYSTOLIC BLOOD PRESSURE: 122 MMHG | HEART RATE: 110 BPM | DIASTOLIC BLOOD PRESSURE: 73 MMHG | OXYGEN SATURATION: 100 % | WEIGHT: 195.11 LBS | HEIGHT: 69 IN | RESPIRATION RATE: 18 BRPM | TEMPERATURE: 100 F

## 2020-03-16 LAB
ALBUMIN SERPL ELPH-MCNC: 3.8 G/DL — SIGNIFICANT CHANGE UP (ref 3.3–5)
ALP SERPL-CCNC: 46 U/L — SIGNIFICANT CHANGE UP (ref 40–120)
ALT FLD-CCNC: 39 U/L — SIGNIFICANT CHANGE UP (ref 10–45)
ANION GAP SERPL CALC-SCNC: 12 MMOL/L — SIGNIFICANT CHANGE UP (ref 5–17)
APPEARANCE UR: CLEAR — SIGNIFICANT CHANGE UP
AST SERPL-CCNC: 29 U/L — SIGNIFICANT CHANGE UP (ref 10–40)
BASOPHILS # BLD AUTO: 0 K/UL — SIGNIFICANT CHANGE UP (ref 0–0.2)
BASOPHILS NFR BLD AUTO: 0 % — SIGNIFICANT CHANGE UP (ref 0–2)
BILIRUB SERPL-MCNC: 0.3 MG/DL — SIGNIFICANT CHANGE UP (ref 0.2–1.2)
BILIRUB UR-MCNC: NEGATIVE — SIGNIFICANT CHANGE UP
BUN SERPL-MCNC: 9 MG/DL — SIGNIFICANT CHANGE UP (ref 7–23)
CALCIUM SERPL-MCNC: 8.4 MG/DL — SIGNIFICANT CHANGE UP (ref 8.4–10.5)
CHLORIDE SERPL-SCNC: 100 MMOL/L — SIGNIFICANT CHANGE UP (ref 96–108)
CO2 SERPL-SCNC: 24 MMOL/L — SIGNIFICANT CHANGE UP (ref 22–31)
COLOR SPEC: SIGNIFICANT CHANGE UP
CREAT SERPL-MCNC: 0.8 MG/DL — SIGNIFICANT CHANGE UP (ref 0.5–1.3)
DIFF PNL FLD: NEGATIVE — SIGNIFICANT CHANGE UP
EOSINOPHIL # BLD AUTO: 0 K/UL — SIGNIFICANT CHANGE UP (ref 0–0.5)
EOSINOPHIL NFR BLD AUTO: 0 % — SIGNIFICANT CHANGE UP (ref 0–6)
GLUCOSE SERPL-MCNC: 112 MG/DL — HIGH (ref 70–99)
GLUCOSE UR QL: NEGATIVE — SIGNIFICANT CHANGE UP
HCG UR QL: NEGATIVE — SIGNIFICANT CHANGE UP
HCT VFR BLD CALC: 39.5 % — SIGNIFICANT CHANGE UP (ref 34.5–45)
HGB BLD-MCNC: 12.4 G/DL — SIGNIFICANT CHANGE UP (ref 11.5–15.5)
KETONES UR-MCNC: NEGATIVE — SIGNIFICANT CHANGE UP
LEUKOCYTE ESTERASE UR-ACNC: NEGATIVE — SIGNIFICANT CHANGE UP
LYMPHOCYTES # BLD AUTO: 0.37 K/UL — LOW (ref 1–3.3)
LYMPHOCYTES # BLD AUTO: 12 % — LOW (ref 13–44)
MCHC RBC-ENTMCNC: 25.7 PG — LOW (ref 27–34)
MCHC RBC-ENTMCNC: 31.4 GM/DL — LOW (ref 32–36)
MCV RBC AUTO: 82 FL — SIGNIFICANT CHANGE UP (ref 80–100)
MONOCYTES # BLD AUTO: 0.28 K/UL — SIGNIFICANT CHANGE UP (ref 0–0.9)
MONOCYTES NFR BLD AUTO: 9 % — SIGNIFICANT CHANGE UP (ref 2–14)
NEUTROPHILS # BLD AUTO: 2.46 K/UL — SIGNIFICANT CHANGE UP (ref 1.8–7.4)
NEUTROPHILS NFR BLD AUTO: 69 % — SIGNIFICANT CHANGE UP (ref 43–77)
NITRITE UR-MCNC: NEGATIVE — SIGNIFICANT CHANGE UP
PH UR: 7 — SIGNIFICANT CHANGE UP (ref 5–8)
PLATELET # BLD AUTO: 174 K/UL — SIGNIFICANT CHANGE UP (ref 150–400)
POTASSIUM SERPL-MCNC: 3.9 MMOL/L — SIGNIFICANT CHANGE UP (ref 3.5–5.3)
POTASSIUM SERPL-SCNC: 3.9 MMOL/L — SIGNIFICANT CHANGE UP (ref 3.5–5.3)
PROT SERPL-MCNC: 7.3 G/DL — SIGNIFICANT CHANGE UP (ref 6–8.3)
PROT UR-MCNC: NEGATIVE — SIGNIFICANT CHANGE UP
RAPID RVP RESULT: SIGNIFICANT CHANGE UP
RBC # BLD: 4.82 M/UL — SIGNIFICANT CHANGE UP (ref 3.8–5.2)
RBC # FLD: 13.4 % — SIGNIFICANT CHANGE UP (ref 10.3–14.5)
SODIUM SERPL-SCNC: 136 MMOL/L — SIGNIFICANT CHANGE UP (ref 135–145)
SP GR SPEC: 1.02 — SIGNIFICANT CHANGE UP (ref 1.01–1.02)
UROBILINOGEN FLD QL: NEGATIVE — SIGNIFICANT CHANGE UP
WBC # BLD: 3.12 K/UL — LOW (ref 3.8–10.5)
WBC # FLD AUTO: 3.12 K/UL — LOW (ref 3.8–10.5)

## 2020-03-16 PROCEDURE — 87486 CHLMYD PNEUM DNA AMP PROBE: CPT

## 2020-03-16 PROCEDURE — 81025 URINE PREGNANCY TEST: CPT

## 2020-03-16 PROCEDURE — 87040 BLOOD CULTURE FOR BACTERIA: CPT

## 2020-03-16 PROCEDURE — 99284 EMERGENCY DEPT VISIT MOD MDM: CPT

## 2020-03-16 PROCEDURE — 87798 DETECT AGENT NOS DNA AMP: CPT

## 2020-03-16 PROCEDURE — 81003 URINALYSIS AUTO W/O SCOPE: CPT

## 2020-03-16 PROCEDURE — 80053 COMPREHEN METABOLIC PANEL: CPT

## 2020-03-16 PROCEDURE — 99284 EMERGENCY DEPT VISIT MOD MDM: CPT | Mod: 25

## 2020-03-16 PROCEDURE — 87633 RESP VIRUS 12-25 TARGETS: CPT

## 2020-03-16 PROCEDURE — 87086 URINE CULTURE/COLONY COUNT: CPT

## 2020-03-16 PROCEDURE — 87581 M.PNEUMON DNA AMP PROBE: CPT

## 2020-03-16 PROCEDURE — 71046 X-RAY EXAM CHEST 2 VIEWS: CPT | Mod: 26

## 2020-03-16 PROCEDURE — 71046 X-RAY EXAM CHEST 2 VIEWS: CPT

## 2020-03-16 PROCEDURE — 85027 COMPLETE CBC AUTOMATED: CPT

## 2020-03-16 PROCEDURE — 96374 THER/PROPH/DIAG INJ IV PUSH: CPT

## 2020-03-16 RX ORDER — METOCLOPRAMIDE HCL 10 MG
10 TABLET ORAL ONCE
Refills: 0 | Status: COMPLETED | OUTPATIENT
Start: 2020-03-16 | End: 2020-03-16

## 2020-03-16 RX ORDER — SODIUM CHLORIDE 9 MG/ML
1000 INJECTION, SOLUTION INTRAVENOUS ONCE
Refills: 0 | Status: COMPLETED | OUTPATIENT
Start: 2020-03-16 | End: 2020-03-16

## 2020-03-16 RX ORDER — ACETAMINOPHEN 500 MG
650 TABLET ORAL ONCE
Refills: 0 | Status: COMPLETED | OUTPATIENT
Start: 2020-03-16 | End: 2020-03-16

## 2020-03-16 RX ADMIN — Medication 10 MILLIGRAM(S): at 06:31

## 2020-03-16 RX ADMIN — SODIUM CHLORIDE 1000 MILLILITER(S): 9 INJECTION, SOLUTION INTRAVENOUS at 06:36

## 2020-03-16 RX ADMIN — Medication 650 MILLIGRAM(S): at 06:31

## 2020-03-16 NOTE — ED PROVIDER NOTE - PROGRESS NOTE DETAILS
Ayaz CHOE MD PGY2: Patient reassessed. Clinically appears and feels well. Patient would like to go home. Instructed to follow-up RVP. Also recommended self quarantine. Julio Cesar blood cultures for 10% bands, patient otherwise not meeting other sepsis criteria.

## 2020-03-16 NOTE — ED PROVIDER NOTE - NS ED ROS FT
CONSTITUTIONAL: +Fevers  Cardiovascular: No Chest pain  Respiratory: No SOB  Gastrointestinal: No n/v/d, no abd pain  Genitourinary: no dysuria, no hematuria  SKIN: no rashes.  NEURO: no headache, no weakness or numbness  PSYCHIATRIC: no known mental health issues.

## 2020-03-16 NOTE — ED PROVIDER NOTE - PHYSICAL EXAMINATION
General: well appearing, interactive, well nourished, NAD  HEENT: pupils equal and reactive, normal external ears bilaterally   Cardiac: RRR, no MRG appreciated  Resp: lungs clear to auscultation bilaterally, symmetric chest wall rise  Abd: soft, nontender, nondistended,   : no CVA tenderness  Neuro: Moving all extremities, cn 2-12 intact, negative brudinzkis and kernigs   Skin:  normal color for race

## 2020-03-16 NOTE — ED PROVIDER NOTE - NSFOLLOWUPINSTRUCTIONS_ED_ALL_ED_FT
Please follow up with your primary physician in 24-48 hr.  Seek immediate medical care for any new/worsening signs or symptoms.  Please take 600 mg of ibuprofen (aka Motrin, Advil) and/or 650-1000 mg acetaminophen (aka Tylenol) every 6 hours, as needed, for mild-moderate pain.  Do NOT exceed 3500mg acetaminophen in 24 hours.  Please do not take these medications if you do not have pain or if you have any history of bleeding disorders, kidney or liver disease.   Do not use ibuprofen if you are on blood thinners (anti-coagulation).  YOU WERE SEEN IN THE ED FOR A LIKELY VIRAL ILLNESS. WE CANNOT PERFORM DEFINITIVE TESTING AT THIS TIME FOR THE NOVEL CORONAVIRUS. PLEASE READ THE INFORMATION PACKET PROVIDED TO YOU CAREFULLY.     YOU SHOULD SELF-QUARANTINE FOR 14 DAYS TO AVOID POTENTIAL SPREAD OF THE CORONAVIRUS.     YOU WERE TESTED FOR MORE COMMON VIRUSES (THE FLU AND COMMON COLD VIRUSES). YOU WILL BE CONTACTED IF YOU TEST POSITIVE FOR ANY OTHER VIRUSES.    YOU WILL BE CONTACTED BY [facility] OR YOUR LOCAL HEALTH DEPARTMENT AFTER YOUR DISCHARGE TO HELP GUIDE YOU IN YOUR QUARANTINE AND ADVISE YOU FURTHER.    YOU MAY USE TYLENOL AND/OR MOTRIN AS NEEDED FOR PAIN OR FEVERS.    RETURN TO THE ED IF YOU DEVELOP TROUBLE BREATHING. Please follow up with your primary physician in 24-48 hr.  Please call the emergency department in 2 - 3 hours for the results of your respiratory virus panel. Note that this does not include the novel coronavirus testing.   Seek immediate medical care for any new/worsening signs or symptoms.  Please take 600 mg of ibuprofen (aka Motrin, Advil) and/or 650-1000 mg acetaminophen (aka Tylenol) every 6 hours, as needed, for mild-moderate pain.  Do NOT exceed 3500mg acetaminophen in 24 hours.  Please do not take these medications if you do not have pain or if you have any history of bleeding disorders, kidney or liver disease.   Do not use ibuprofen if you are on blood thinners (anti-coagulation).  YOU WERE SEEN IN THE ED FOR A LIKELY VIRAL ILLNESS. WE CANNOT PERFORM DEFINITIVE TESTING AT THIS TIME FOR THE NOVEL CORONAVIRUS. PLEASE READ THE INFORMATION PACKET PROVIDED TO YOU CAREFULLY.     YOU SHOULD SELF-QUARANTINE FOR 14 DAYS TO AVOID POTENTIAL SPREAD OF THE CORONAVIRUS.     YOU WERE TESTED FOR MORE COMMON VIRUSES (THE FLU AND COMMON COLD VIRUSES). YOU WILL BE CONTACTED IF YOU TEST POSITIVE FOR ANY OTHER VIRUSES.    YOU WILL BE CONTACTED BY [facility] OR YOUR LOCAL HEALTH DEPARTMENT AFTER YOUR DISCHARGE TO HELP GUIDE YOU IN YOUR QUARANTINE AND ADVISE YOU FURTHER.    YOU MAY USE TYLENOL AND/OR MOTRIN AS NEEDED FOR PAIN OR FEVERS.    RETURN TO THE ED IF YOU DEVELOP TROUBLE BREATHING.

## 2020-03-16 NOTE — ED ADULT NURSE NOTE - OBJECTIVE STATEMENT
45 yo F, denies PMHx presents to ED via waiting room from home c/o fever/chills. Pt states symptoms began on Friday, associated w/ decreased PO intake d/t decreased appetite, but denies n/v. No recent travel or known contacts w/ positive COVID19. Pt  at home sick last week w/ similar symptoms prior to pt beginning to feel ill. Reports highest fever at home of 102F, last took tylenol last night. Pt denies any CP, SOB, N/V, urinary complaints, constipation, diarrhea, HA, dizziness, weakness. Pt A&Ox4, lungs CTA, +central pulses. Abdomen soft, not tender, not distended. Ambulating w/ steady gait, safety and comfort maintained, no acute distress noted at this time.

## 2020-03-16 NOTE — ED PROVIDER NOTE - OBJECTIVE STATEMENT
43yo F here with fevers    Fevers since friday, not going away prompting pt to come in. Denies cough, +headache and malaisea and body aches. No CP/SOB. No urinary sx, no rashes.  with similar sx.

## 2020-03-16 NOTE — ED PROVIDER NOTE - ATTENDING CONTRIBUTION TO CARE
45yo female no pmh p/w fevers x 3 days a/w generalized headache, no other symptoms.  with similar symptoms. Denies recent travel, vomiting, diarrhea, dysuria, abdominal pain, chest pain, dyspnea, cough. Exam unremarkable, negative Kernig's and Brudzinski's signs. Labs, RVP, meds, reassess. same name as above

## 2020-03-16 NOTE — ED PROVIDER NOTE - CLINICAL SUMMARY MEDICAL DECISION MAKING FREE TEXT BOX
John PGY-2:  43yo F here with fever and body aches in context of  with similar sx likely viral URI, here tachycardic will obtain bloodwork, cxr and reassess, if clinically remains stable and VS improve anticipate DC

## 2020-03-16 NOTE — ED ADULT NURSE REASSESSMENT NOTE - NS ED NURSE REASSESS COMMENT FT1
Pt. resting comfortably in stretcher in no acute distress. Reports headache is improving. IV fluids infusing. VSS. Will continue to reassess.

## 2020-03-16 NOTE — ED PROVIDER NOTE - PATIENT PORTAL LINK FT
You can access the FollowMyHealth Patient Portal offered by [facility] by registering at the following website: [email or web address]. By joining [facility]’s FollowMyHealth portal, you will also be able to view your health information using other applications (apps) compatible with our system.

## 2020-03-17 LAB
CULTURE RESULTS: SIGNIFICANT CHANGE UP
SPECIMEN SOURCE: SIGNIFICANT CHANGE UP

## 2020-03-19 ENCOUNTER — TRANSCRIPTION ENCOUNTER (OUTPATIENT)
Age: 45
End: 2020-03-19

## 2020-03-21 LAB
CULTURE RESULTS: SIGNIFICANT CHANGE UP
CULTURE RESULTS: SIGNIFICANT CHANGE UP
SPECIMEN SOURCE: SIGNIFICANT CHANGE UP
SPECIMEN SOURCE: SIGNIFICANT CHANGE UP

## 2023-01-05 ENCOUNTER — APPOINTMENT (OUTPATIENT)
Dept: ORTHOPEDIC SURGERY | Facility: CLINIC | Age: 48
End: 2023-01-05

## 2023-08-29 ENCOUNTER — APPOINTMENT (RX ONLY)
Dept: URBAN - METROPOLITAN AREA CLINIC 299 | Facility: CLINIC | Age: 48
Setting detail: DERMATOLOGY
End: 2023-08-29

## 2023-08-29 DIAGNOSIS — D22 MELANOCYTIC NEVI: ICD-10-CM | Status: STABLE

## 2023-08-29 DIAGNOSIS — L82.1 OTHER SEBORRHEIC KERATOSIS: ICD-10-CM | Status: STABLE

## 2023-08-29 DIAGNOSIS — L82.0 INFLAMED SEBORRHEIC KERATOSIS: ICD-10-CM

## 2023-08-29 DIAGNOSIS — D18.0 HEMANGIOMA: ICD-10-CM | Status: STABLE

## 2023-08-29 DIAGNOSIS — L81.4 OTHER MELANIN HYPERPIGMENTATION: ICD-10-CM | Status: STABLE

## 2023-08-29 DIAGNOSIS — L40.0 PSORIASIS VULGARIS: ICD-10-CM | Status: INADEQUATELY CONTROLLED

## 2023-08-29 PROBLEM — D18.01 HEMANGIOMA OF SKIN AND SUBCUTANEOUS TISSUE: Status: ACTIVE | Noted: 2023-08-29

## 2023-08-29 PROBLEM — D22.5 MELANOCYTIC NEVI OF TRUNK: Status: ACTIVE | Noted: 2023-08-29

## 2023-08-29 PROCEDURE — ? COUNSELING

## 2023-08-29 PROCEDURE — ? PRESCRIPTION MEDICATION MANAGEMENT

## 2023-08-29 PROCEDURE — 99204 OFFICE O/P NEW MOD 45 MIN: CPT | Mod: 25

## 2023-08-29 PROCEDURE — ? SUNSCREEN RECOMMENDATIONS

## 2023-08-29 PROCEDURE — 17110 DESTRUCTION B9 LES UP TO 14: CPT

## 2023-08-29 PROCEDURE — ? LIQUID NITROGEN

## 2023-08-29 PROCEDURE — ? PRESCRIPTION

## 2023-08-29 RX ORDER — CLOBETASOL PROPIONATE 0.5 MG/ML
SOLUTION TOPICAL BID
Qty: 50 | Refills: 3 | Status: ERX | COMMUNITY
Start: 2023-08-29

## 2023-08-29 RX ADMIN — CLOBETASOL PROPIONATE: 0.5 SOLUTION TOPICAL at 00:00

## 2023-08-29 ASSESSMENT — LOCATION SIMPLE DESCRIPTION DERM
LOCATION SIMPLE: LEFT FOREHEAD
LOCATION SIMPLE: LEFT LOWER BACK
LOCATION SIMPLE: RIGHT LOWER BACK
LOCATION SIMPLE: RIGHT CHEEK
LOCATION SIMPLE: LEFT UPPER BACK
LOCATION SIMPLE: LEFT SHOULDER
LOCATION SIMPLE: RIGHT FOREHEAD

## 2023-08-29 ASSESSMENT — LOCATION DETAILED DESCRIPTION DERM
LOCATION DETAILED: LEFT MEDIAL FOREHEAD
LOCATION DETAILED: LEFT MID-UPPER BACK
LOCATION DETAILED: LEFT INFERIOR LATERAL MIDBACK
LOCATION DETAILED: RIGHT SUPERIOR FOREHEAD
LOCATION DETAILED: LEFT ANTERIOR SHOULDER
LOCATION DETAILED: LEFT SUPERIOR UPPER BACK
LOCATION DETAILED: RIGHT CENTRAL MALAR CHEEK
LOCATION DETAILED: LEFT SUPERIOR FOREHEAD
LOCATION DETAILED: RIGHT SUPERIOR MEDIAL MIDBACK

## 2023-08-29 ASSESSMENT — LOCATION ZONE DERM
LOCATION ZONE: TRUNK
LOCATION ZONE: FACE
LOCATION ZONE: ARM

## 2023-08-29 NOTE — PROCEDURE: LIQUID NITROGEN
Show Applicator Variable?: Yes
Consent: The patient's consent was obtained including but not limited to risks of crusting, scabbing, blistering, scarring, darker or lighter pigmentary change, recurrence, incomplete removal and infection.
Add 52 Modifier (Optional): no
Medical Necessity Information: It is in your best interest to select a reason for this procedure from the list below. All of these items fulfill various CMS LCD requirements except the new and changing color options.
Medical Necessity Clause: This procedure was medically necessary because the lesions that were treated were: symptomatic and negatively impacting patient
Detail Level: Detailed
Post-Care Instructions: I reviewed with the patient in detail post-care instructions. Patient is to wear sunprotection, and avoid picking at any of the treated lesions. Pt may apply Vaseline to crusted or scabbing areas.
Spray Paint Text: The liquid nitrogen was applied to the skin utilizing a spray paint frosting technique.

## 2023-08-29 NOTE — PROCEDURE: PRESCRIPTION MEDICATION MANAGEMENT
Render In Strict Bullet Format?: No
Initiate Treatment: clobetasol 0.05 % scalp solution BID
Detail Level: Zone

## 2023-10-10 ENCOUNTER — APPOINTMENT (RX ONLY)
Dept: URBAN - METROPOLITAN AREA CLINIC 299 | Facility: CLINIC | Age: 48
Setting detail: DERMATOLOGY
End: 2023-10-10

## 2023-10-10 DIAGNOSIS — L23.9 ALLERGIC CONTACT DERMATITIS, UNSPECIFIED CAUSE: ICD-10-CM

## 2023-10-10 DIAGNOSIS — L82.0 INFLAMED SEBORRHEIC KERATOSIS: ICD-10-CM

## 2023-10-10 DIAGNOSIS — L82.1 OTHER SEBORRHEIC KERATOSIS: ICD-10-CM

## 2023-10-10 DIAGNOSIS — L40.0 PSORIASIS VULGARIS: ICD-10-CM

## 2023-10-10 PROCEDURE — 17110 DESTRUCTION B9 LES UP TO 14: CPT

## 2023-10-10 PROCEDURE — ? OTC TREATMENT REGIMEN

## 2023-10-10 PROCEDURE — ? PRESCRIPTION MEDICATION MANAGEMENT

## 2023-10-10 PROCEDURE — ? LIQUID NITROGEN

## 2023-10-10 PROCEDURE — 99214 OFFICE O/P EST MOD 30 MIN: CPT | Mod: 25

## 2023-10-10 PROCEDURE — ? COUNSELING

## 2023-10-10 PROCEDURE — ? PRESCRIPTION

## 2023-10-10 RX ORDER — TRIAMCINOLONE ACETONIDE 0.25 MG/G
CREAM TOPICAL
Qty: 80 | Refills: 4 | Status: ERX | COMMUNITY
Start: 2023-10-10

## 2023-10-10 RX ORDER — TAPINAROF 10 MG/1000MG
CREAM TOPICAL
Qty: 60 | Refills: 2 | Status: ERX | COMMUNITY
Start: 2023-10-10

## 2023-10-10 RX ORDER — CLOBETASOL PROPIONATE 0.5 MG/ML
SOLUTION TOPICAL BID
Qty: 50 | Refills: 3 | Status: ERX

## 2023-10-10 RX ADMIN — TRIAMCINOLONE ACETONIDE: 0.25 CREAM TOPICAL at 00:00

## 2023-10-10 RX ADMIN — TAPINAROF: 10 CREAM TOPICAL at 00:00

## 2023-10-10 ASSESSMENT — LOCATION SIMPLE DESCRIPTION DERM
LOCATION SIMPLE: RIGHT FOREHEAD
LOCATION SIMPLE: LEFT FOREHEAD
LOCATION SIMPLE: CHEST
LOCATION SIMPLE: LEFT CHEEK

## 2023-10-10 ASSESSMENT — LOCATION DETAILED DESCRIPTION DERM
LOCATION DETAILED: LEFT SUPERIOR FOREHEAD
LOCATION DETAILED: STERNAL NOTCH
LOCATION DETAILED: RIGHT SUPERIOR FOREHEAD
LOCATION DETAILED: LEFT INFERIOR LATERAL MALAR CHEEK

## 2023-10-10 ASSESSMENT — LOCATION ZONE DERM
LOCATION ZONE: TRUNK
LOCATION ZONE: FACE

## 2023-10-10 NOTE — PROCEDURE: PRESCRIPTION MEDICATION MANAGEMENT
Render In Strict Bullet Format?: No
Continue Regimen: clobetasol 0.05 % scalp solution BID
Initiate Treatment: Vtama 1 % topical cream
Detail Level: Zone

## 2023-10-10 NOTE — PROCEDURE: OTC TREATMENT REGIMEN
Patient Specific Otc Recommendations (Will Not Stick From Patient To Patient): Selenium supplement
Detail Level: Zone

## 2023-10-10 NOTE — PROCEDURE: LIQUID NITROGEN
Spray Paint Technique: No
Medical Necessity Clause: This procedure was medically necessary because the lesions that were treated were: symptomatic and negatively impacting patient
Show Aperture Variable?: Yes
Medical Necessity Information: It is in your best interest to select a reason for this procedure from the list below. All of these items fulfill various CMS LCD requirements except the new and changing color options.
Detail Level: Detailed
Spray Paint Text: The liquid nitrogen was applied to the skin utilizing a spray paint frosting technique.
Post-Care Instructions: I reviewed with the patient in detail post-care instructions. Patient is to wear sunprotection, and avoid picking at any of the treated lesions. Pt may apply Vaseline to crusted or scabbing areas.
Consent: The patient's consent was obtained including but not limited to risks of crusting, scabbing, blistering, scarring, darker or lighter pigmentary change, recurrence, incomplete removal and infection.

## 2023-10-10 NOTE — HPI: FOLLOW-UP
What Is The Condition That You Are Returning For Follow-Up?: psoriasis [id number]: condition_psoriasis

## 2023-12-05 ENCOUNTER — APPOINTMENT (RX ONLY)
Dept: URBAN - METROPOLITAN AREA CLINIC 299 | Facility: CLINIC | Age: 48
Setting detail: DERMATOLOGY
End: 2023-12-05

## 2023-12-05 DIAGNOSIS — L40.0 PSORIASIS VULGARIS: ICD-10-CM | Status: INADEQUATELY CONTROLLED

## 2023-12-05 PROCEDURE — ? COUNSELING

## 2023-12-05 PROCEDURE — ? PRESCRIPTION MEDICATION MANAGEMENT

## 2023-12-05 PROCEDURE — 99213 OFFICE O/P EST LOW 20 MIN: CPT

## 2023-12-05 PROCEDURE — ? PRESCRIPTION

## 2023-12-05 RX ORDER — CLOBETASOL PROPIONATE 0.5 MG/G
CREAM TOPICAL BID
Qty: 60 | Refills: 4 | Status: ERX | COMMUNITY
Start: 2023-12-05

## 2023-12-05 RX ADMIN — CLOBETASOL PROPIONATE: 0.5 CREAM TOPICAL at 00:00

## 2023-12-05 ASSESSMENT — LOCATION DETAILED DESCRIPTION DERM
LOCATION DETAILED: LEFT SUPERIOR FOREHEAD
LOCATION DETAILED: RIGHT SUPERIOR FOREHEAD

## 2023-12-05 ASSESSMENT — LOCATION SIMPLE DESCRIPTION DERM
LOCATION SIMPLE: LEFT FOREHEAD
LOCATION SIMPLE: RIGHT FOREHEAD

## 2023-12-05 ASSESSMENT — LOCATION ZONE DERM: LOCATION ZONE: FACE

## 2023-12-05 NOTE — PROCEDURE: PRESCRIPTION MEDICATION MANAGEMENT
Render In Strict Bullet Format?: No
Continue Regimen: clobetasol 0.05 % scalp solution BID
Discontinue Regimen: Vtama 1 % topical cream
Initiate Treatment: clobetasol 0.05 % topical cream BID
Detail Level: Zone

## 2025-01-09 ENCOUNTER — APPOINTMENT (OUTPATIENT)
Dept: URBAN - METROPOLITAN AREA CLINIC 299 | Facility: CLINIC | Age: 50
Setting detail: DERMATOLOGY
End: 2025-01-09

## 2025-01-09 DIAGNOSIS — Z80.8 FAMILY HISTORY OF MALIGNANT NEOPLASM OF OTHER ORGANS OR SYSTEMS: ICD-10-CM

## 2025-01-09 DIAGNOSIS — L82.1 OTHER SEBORRHEIC KERATOSIS: ICD-10-CM

## 2025-01-09 DIAGNOSIS — L81.4 OTHER MELANIN HYPERPIGMENTATION: ICD-10-CM

## 2025-01-09 DIAGNOSIS — D22 MELANOCYTIC NEVI: ICD-10-CM

## 2025-01-09 DIAGNOSIS — D18.0 HEMANGIOMA: ICD-10-CM

## 2025-01-09 DIAGNOSIS — L40.0 PSORIASIS VULGARIS: ICD-10-CM

## 2025-01-09 PROBLEM — D18.01 HEMANGIOMA OF SKIN AND SUBCUTANEOUS TISSUE: Status: ACTIVE | Noted: 2025-01-09

## 2025-01-09 PROBLEM — D22.5 MELANOCYTIC NEVI OF TRUNK: Status: ACTIVE | Noted: 2025-01-09

## 2025-01-09 PROCEDURE — ? COUNSELING

## 2025-01-09 PROCEDURE — ? PRESCRIPTION MEDICATION MANAGEMENT

## 2025-01-09 PROCEDURE — ? PRESCRIPTION

## 2025-01-09 PROCEDURE — 99214 OFFICE O/P EST MOD 30 MIN: CPT

## 2025-01-09 RX ORDER — TACROLIMUS 1 MG/G
OINTMENT TOPICAL EVERY 12 HOURS
Qty: 30 | Refills: 0 | Status: ERX | COMMUNITY
Start: 2025-01-09

## 2025-01-09 RX ORDER — TACROLIMUS 0.3 MG/G
OINTMENT TOPICAL
Qty: 60 | Refills: 4 | Status: ERX | COMMUNITY
Start: 2025-01-09

## 2025-01-09 RX ADMIN — TACROLIMUS: 1 OINTMENT TOPICAL at 00:00

## 2025-01-09 RX ADMIN — TACROLIMUS: 0.3 OINTMENT TOPICAL at 00:00

## 2025-01-09 ASSESSMENT — LOCATION DETAILED DESCRIPTION DERM
LOCATION DETAILED: RIGHT MEDIAL SUPERIOR EYELID
LOCATION DETAILED: LEFT LATERAL SUPERIOR EYELID
LOCATION DETAILED: RIGHT SUPERIOR UPPER BACK

## 2025-01-09 ASSESSMENT — LOCATION SIMPLE DESCRIPTION DERM
LOCATION SIMPLE: RIGHT SUPERIOR EYELID
LOCATION SIMPLE: LEFT SUPERIOR EYELID
LOCATION SIMPLE: RIGHT UPPER BACK

## 2025-01-09 ASSESSMENT — LOCATION ZONE DERM
LOCATION ZONE: TRUNK
LOCATION ZONE: EYELID

## 2025-01-09 NOTE — PROCEDURE: PRESCRIPTION MEDICATION MANAGEMENT
Initiate Treatment: tacrolimus 0.03 % topical ointment (not covered) switched to tacrolimus 0.1%
Render In Strict Bullet Format?: No
Detail Level: Zone